# Patient Record
Sex: FEMALE | Race: OTHER | Employment: STUDENT | URBAN - METROPOLITAN AREA
[De-identification: names, ages, dates, MRNs, and addresses within clinical notes are randomized per-mention and may not be internally consistent; named-entity substitution may affect disease eponyms.]

---
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
## 2021-01-08 ENCOUNTER — ROUTINE PRENATAL (OUTPATIENT)
Dept: OBGYN CLINIC | Age: 21
End: 2021-01-08
Payer: COMMERCIAL

## 2021-01-08 VITALS
HEART RATE: 82 BPM | BODY MASS INDEX: 23.23 KG/M2 | WEIGHT: 127 LBS | DIASTOLIC BLOOD PRESSURE: 60 MMHG | SYSTOLIC BLOOD PRESSURE: 90 MMHG

## 2021-01-08 DIAGNOSIS — Z3A.21 21 WEEKS GESTATION OF PREGNANCY: ICD-10-CM

## 2021-01-08 DIAGNOSIS — Z34.82 ENCOUNTER FOR SUPERVISION OF OTHER NORMAL PREGNANCY IN SECOND TRIMESTER: Primary | ICD-10-CM

## 2021-01-08 PROCEDURE — G8427 DOCREV CUR MEDS BY ELIG CLIN: HCPCS | Performed by: OBSTETRICS & GYNECOLOGY

## 2021-01-08 PROCEDURE — 99212 OFFICE O/P EST SF 10 MIN: CPT | Performed by: OBSTETRICS & GYNECOLOGY

## 2021-01-08 PROCEDURE — G8484 FLU IMMUNIZE NO ADMIN: HCPCS | Performed by: OBSTETRICS & GYNECOLOGY

## 2021-01-08 PROCEDURE — 1036F TOBACCO NON-USER: CPT | Performed by: OBSTETRICS & GYNECOLOGY

## 2021-01-08 PROCEDURE — G8420 CALC BMI NORM PARAMETERS: HCPCS | Performed by: OBSTETRICS & GYNECOLOGY

## 2021-01-08 ASSESSMENT — PATIENT HEALTH QUESTIONNAIRE - PHQ9
SUM OF ALL RESPONSES TO PHQ QUESTIONS 1-9: 0
1. LITTLE INTEREST OR PLEASURE IN DOING THINGS: 0
SUM OF ALL RESPONSES TO PHQ9 QUESTIONS 1 & 2: 0
SUM OF ALL RESPONSES TO PHQ QUESTIONS 1-9: 0
2. FEELING DOWN, DEPRESSED OR HOPELESS: 0
SUM OF ALL RESPONSES TO PHQ QUESTIONS 1-9: 0

## 2021-01-08 NOTE — PROGRESS NOTES
OB visit    Leslie Ward is a 13y.o. year old female  @ 21.1 wk , L 2020 , TITI 2021 confirmed 6 wks US . POB: FTSVD @ 38 wks , female, Navjot Chimera .       PGYN: denies      PMH: arthritis , juvenile rheumatoid arthritis - was on treatment few years ago. MTX             Mild intermittant asthma , albuterol prn.      PSH: denies      MEDS: none      Drug Allergies: NKDA     SOCHX: denies x 3      FH: Mother or Father , DM No , HTN No, Other No    BP 90/60   Pulse 82   Wt 127 lb (57.6 kg)   LMP 2020   BMI 23.23 kg/m²   Past Medical History:   Diagnosis Date    Abnormal hemoglobin (Hgb) ([facility] 2017    Anemia     pt states she takes iron    Asthma     pt states born with asthma, pt uses albuterol    Rheumatoid arthritis flare ([facility] 2018    Spinal muscular atrophy ([facility] 2017     History reviewed. No pertinent surgical history. Review of Systems  Constitutional: negative  Genitourinary:see above  Integument/breast: negative  Behavioral/Psych: negative  Endocrine: negative     All other systems reviewed and are negative. Physical Exam:  BP 90/60   Pulse 82   Wt 127 lb (57.6 kg)   LMP 2020   BMI 23.23 kg/m²   Skin: Warm, dry, no lesions or rashes  Extremities: Without clubbing, cyanosis and edema. Palms and nails are normal. Ambulates without difficulty  Neurological: No gross sensory or motor deficits. Abdomen: Soft, non-tender without masses or organomegaly  bowel sounds normoactive    HOF : suprapubic     FHT : 152 bpm     Assessment:   1. Encounter for supervision of other normal pregnancy in second trimester    2. 21 weeks gestation of pregnancy        Plan:   No orders of the defined types were placed in this encounter. No orders of the defined types were placed in this encounter.     Plan:   Susan Holter, MD    Follow-up in 4 weeks  Early 1 hr OGTT - not indicated  Hep C screen indicated : no  FLU- indicated   TDAP- in third dimas Leonard M.D., SILVIA G

## 2021-02-03 ENCOUNTER — ROUTINE PRENATAL (OUTPATIENT)
Dept: OBGYN CLINIC | Age: 21
End: 2021-02-03
Payer: COMMERCIAL

## 2021-02-03 VITALS
SYSTOLIC BLOOD PRESSURE: 88 MMHG | DIASTOLIC BLOOD PRESSURE: 50 MMHG | BODY MASS INDEX: 23.59 KG/M2 | WEIGHT: 129 LBS | HEART RATE: 84 BPM

## 2021-02-03 DIAGNOSIS — Z34.82 ENCOUNTER FOR SUPERVISION OF OTHER NORMAL PREGNANCY IN SECOND TRIMESTER: Primary | ICD-10-CM

## 2021-02-03 DIAGNOSIS — O26.842 FUNDAL HEIGHT LOW FOR DATES IN SECOND TRIMESTER: ICD-10-CM

## 2021-02-03 DIAGNOSIS — Z3A.24 24 WEEKS GESTATION OF PREGNANCY: ICD-10-CM

## 2021-02-03 PROCEDURE — 99213 OFFICE O/P EST LOW 20 MIN: CPT | Performed by: OBSTETRICS & GYNECOLOGY

## 2021-02-03 PROCEDURE — G8427 DOCREV CUR MEDS BY ELIG CLIN: HCPCS | Performed by: OBSTETRICS & GYNECOLOGY

## 2021-02-03 PROCEDURE — G8484 FLU IMMUNIZE NO ADMIN: HCPCS | Performed by: OBSTETRICS & GYNECOLOGY

## 2021-02-03 PROCEDURE — G8420 CALC BMI NORM PARAMETERS: HCPCS | Performed by: OBSTETRICS & GYNECOLOGY

## 2021-02-03 PROCEDURE — 1036F TOBACCO NON-USER: CPT | Performed by: OBSTETRICS & GYNECOLOGY

## 2021-02-03 NOTE — PROGRESS NOTES
OB visit    Alayna Kumar is a 13y.o. year old female  @ 24.6 wk , L 2020 , TITI 2021 confirmed 6 wks US . POB: FTSVD @ 38 wks , female, Arvella Bones .       PGYN: denies      PMH: arthritis , juvenile rheumatoid arthritis - was on treatment few years ago. MTX             Mild intermittant asthma , albuterol prn.      PSH: denies      MEDS: none      Drug Allergies: NKDA     SOCHX: denies x 3      FH: Mother or Father , DM No , HTN No, Other No    BP (!) 88/50   Pulse 84   Wt 129 lb (58.5 kg)   LMP 2020   BMI 23.59 kg/m²   Past Medical History:   Diagnosis Date    Abnormal hemoglobin (Hgb) ([facility] 2017    Anemia     pt states she takes iron    Asthma     pt states born with asthma, pt uses albuterol    Rheumatoid arthritis flare ([address] 2018    Spinal muscular atrophy ([facility] 2017     History reviewed. No pertinent surgical history. Review of Systems  Constitutional: negative  Genitourinary:see above  Integument/breast: negative  Behavioral/Psych: negative  Endocrine: negative     All other systems reviewed and are negative. Physical Exam:  BP (!) 88/50   Pulse 84   Wt 129 lb (58.5 kg)   LMP 2020   BMI 23.59 kg/m²   Skin: Warm, dry, no lesions or rashes  Extremities: Without clubbing, cyanosis and edema. Palms and nails are normal. Ambulates without difficulty  Neurological: No gross sensory or motor deficits. Abdomen: Soft, non-tender without masses or organomegaly  bowel sounds normoactive    HOF : 25 cm      FHT : 152 bpm     Assessment:   1. Encounter for supervision of other normal pregnancy in second trimester    2. 24 weeks gestation of pregnancy    3.  Fundal height low for dates in second trimester        Plan:   Orders Placed This Encounter   Procedures    US OB 1 OR MORE FETUS LIMITED     Standing Status:   Future     Standing Expiration Date:   2/3/2022    CBC Auto Differential     Standing Status:   Future     Standing Expiration Date:   2/3/2022    Glucose tolerance, 1 hour     Standing Status:   Future     Standing Expiration Date:   2/3/2022        No orders of the defined types were placed in this encounter. Plan:   Claudetta Crews, MD    Follow-up in 4 weeks  Early 1 hr OGTT - not indicated  Hep C screen indicated : no  FLU- indicated   TDAP- in third trimester    Maycol Diaz M.D., F.A.C.O. G

## 2021-02-10 ENCOUNTER — HOSPITAL ENCOUNTER (OUTPATIENT)
Dept: ULTRASOUND IMAGING | Age: 21
Discharge: HOME | End: 2021-02-12
Payer: COMMERCIAL

## 2021-02-10 DIAGNOSIS — O26.842 FUNDAL HEIGHT LOW FOR DATES IN SECOND TRIMESTER: ICD-10-CM

## 2021-02-10 PROCEDURE — 76815 OB US LIMITED FETUS(S): CPT

## 2021-03-16 ENCOUNTER — ROUTINE PRENATAL (OUTPATIENT)
Dept: OBGYN CLINIC | Age: 21
End: 2021-03-16
Payer: COMMERCIAL

## 2021-03-16 VITALS
HEART RATE: 92 BPM | WEIGHT: 130 LBS | DIASTOLIC BLOOD PRESSURE: 62 MMHG | SYSTOLIC BLOOD PRESSURE: 102 MMHG | BODY MASS INDEX: 23.78 KG/M2

## 2021-03-16 DIAGNOSIS — Z3A.30 30 WEEKS GESTATION OF PREGNANCY: ICD-10-CM

## 2021-03-16 DIAGNOSIS — Z34.82 ENCOUNTER FOR SUPERVISION OF OTHER NORMAL PREGNANCY IN SECOND TRIMESTER: Primary | ICD-10-CM

## 2021-03-16 PROCEDURE — G8427 DOCREV CUR MEDS BY ELIG CLIN: HCPCS | Performed by: OBSTETRICS & GYNECOLOGY

## 2021-03-16 PROCEDURE — 99213 OFFICE O/P EST LOW 20 MIN: CPT | Performed by: OBSTETRICS & GYNECOLOGY

## 2021-03-16 PROCEDURE — G8420 CALC BMI NORM PARAMETERS: HCPCS | Performed by: OBSTETRICS & GYNECOLOGY

## 2021-03-16 PROCEDURE — 1036F TOBACCO NON-USER: CPT | Performed by: OBSTETRICS & GYNECOLOGY

## 2021-03-16 PROCEDURE — G8484 FLU IMMUNIZE NO ADMIN: HCPCS | Performed by: OBSTETRICS & GYNECOLOGY

## 2021-04-05 ENCOUNTER — ROUTINE PRENATAL (OUTPATIENT)
Dept: OBGYN CLINIC | Age: 21
End: 2021-04-05
Payer: COMMERCIAL

## 2021-04-05 VITALS
HEART RATE: 84 BPM | WEIGHT: 131 LBS | SYSTOLIC BLOOD PRESSURE: 92 MMHG | DIASTOLIC BLOOD PRESSURE: 50 MMHG | BODY MASS INDEX: 23.96 KG/M2

## 2021-04-05 DIAGNOSIS — Z34.82 ENCOUNTER FOR SUPERVISION OF OTHER NORMAL PREGNANCY IN SECOND TRIMESTER: Primary | ICD-10-CM

## 2021-04-05 DIAGNOSIS — Z3A.33 33 WEEKS GESTATION OF PREGNANCY: ICD-10-CM

## 2021-04-05 PROCEDURE — 99213 OFFICE O/P EST LOW 20 MIN: CPT | Performed by: OBSTETRICS & GYNECOLOGY

## 2021-04-05 PROCEDURE — G8427 DOCREV CUR MEDS BY ELIG CLIN: HCPCS | Performed by: OBSTETRICS & GYNECOLOGY

## 2021-04-05 PROCEDURE — G8420 CALC BMI NORM PARAMETERS: HCPCS | Performed by: OBSTETRICS & GYNECOLOGY

## 2021-04-05 PROCEDURE — 1036F TOBACCO NON-USER: CPT | Performed by: OBSTETRICS & GYNECOLOGY

## 2021-04-05 RX ORDER — ALBUTEROL SULFATE 90 UG/1
2 AEROSOL, METERED RESPIRATORY (INHALATION) EVERY 6 HOURS PRN
Qty: 1 INHALER | Refills: 0 | Status: SHIPPED | OUTPATIENT
Start: 2021-04-05

## 2021-04-05 RX ORDER — PNV NO.95/FERROUS FUM/FOLIC AC 28MG-0.8MG
1 TABLET ORAL 2 TIMES DAILY
Qty: 60 TABLET | Refills: 2 | Status: SHIPPED | OUTPATIENT
Start: 2021-04-05

## 2021-04-05 NOTE — PROGRESS NOTES
OB visit    Lilliana Kang is a 13y.o. year old female  @ 33.4 wk , L 2020 , TITI 2021 confirmed 6 wks US . POB: FTSVD @ 38 wks , female, Heydi Sepulveda .       PGYN: denies      PMH: arthritis , juvenile rheumatoid arthritis - was on treatment few years ago. MTX             Mild intermittant asthma , albuterol prn.      PSH: denies      MEDS: none      Drug Allergies: NKDA     SOCHX: denies x 3      FH: Mother or Father , DM No , HTN No, Other No    BP (!) 92/50   Pulse 84   Wt 131 lb (59.4 kg)   LMP 2020   BMI 23.96 kg/m²   Past Medical History:   Diagnosis Date    Abnormal hemoglobin (Hgb) ([facility] [address] 2017    Anemia     pt states she takes iron    Asthma     pt states born with asthma, pt uses albuterol    Rheumatoid arthritis flare ([facility] [address] 2018    Spinal muscular atrophy ([facility] [address] 2017     No past surgical history on file. Review of Systems  Constitutional: negative  Genitourinary:see above  Integument/breast: negative  Behavioral/Psych: negative  Endocrine: negative     All other systems reviewed and are negative. Physical Exam:  BP (!) 92/50   Pulse 84   Wt 131 lb (59.4 kg)   LMP 2020   BMI 23.96 kg/m²   Skin: Warm, dry, no lesions or rashes  Extremities: Without clubbing, cyanosis and edema. Palms and nails are normal. Ambulates without difficulty  Neurological: No gross sensory or motor deficits. Abdomen: Soft, non-tender without masses or organomegaly  bowel sounds normoactive    HOF : 33 cm      FHT : 152 bpm     Assessment:   1.  Encounter for supervision of other normal pregnancy in second trimester    2. 33 weeks gestation of pregnancy        Plan:   Orders Placed This Encounter   Procedures    US OB 1 OR MORE FETUS LIMITED     Standing Status:   Future     Standing Expiration Date:   2022        Orders Placed This Encounter   Medications    albuterol sulfate  (90 Base) MCG/ACT inhaler     Sig: Inhale 2 puffs into the lungs every 6 hours as needed for Wheezing Indications: as needed for asthma     Dispense:  1 Inhaler     Refill:  0    Ferrous Sulfate (IRON) 325 (65 Fe) MG TABS     Sig: Take 1 tablet by mouth 2 times daily     Dispense:  60 tablet     Refill:  2     Plan:   Kale Pappas MD    Follow-up in 2 weeks  Early 1 hr OGTT - not indicated  Hep C screen indicated : no  FLU- indicated   TDAP- in third trimester    Dion Pittman M.D., F.A.C.O. G

## 2021-04-13 ENCOUNTER — HOSPITAL ENCOUNTER (OUTPATIENT)
Dept: ULTRASOUND IMAGING | Age: 21
Discharge: HOME | End: 2021-04-15
Payer: COMMERCIAL

## 2021-04-13 DIAGNOSIS — Z3A.33 33 WEEKS GESTATION OF PREGNANCY: ICD-10-CM

## 2021-04-13 DIAGNOSIS — Z34.82 ENCOUNTER FOR SUPERVISION OF OTHER NORMAL PREGNANCY IN SECOND TRIMESTER: ICD-10-CM

## 2021-04-13 PROCEDURE — 76815 OB US LIMITED FETUS(S): CPT

## 2021-04-21 ENCOUNTER — ROUTINE PRENATAL (OUTPATIENT)
Dept: OBGYN CLINIC | Age: 21
End: 2021-04-21
Payer: COMMERCIAL

## 2021-04-21 VITALS
SYSTOLIC BLOOD PRESSURE: 100 MMHG | DIASTOLIC BLOOD PRESSURE: 68 MMHG | BODY MASS INDEX: 24.51 KG/M2 | WEIGHT: 134 LBS | HEART RATE: 75 BPM

## 2021-04-21 DIAGNOSIS — Z3A.35 35 WEEKS GESTATION OF PREGNANCY: ICD-10-CM

## 2021-04-21 DIAGNOSIS — Z34.82 ENCOUNTER FOR SUPERVISION OF OTHER NORMAL PREGNANCY IN SECOND TRIMESTER: ICD-10-CM

## 2021-04-21 DIAGNOSIS — Z34.82 ENCOUNTER FOR SUPERVISION OF OTHER NORMAL PREGNANCY IN SECOND TRIMESTER: Primary | ICD-10-CM

## 2021-04-21 PROCEDURE — 1036F TOBACCO NON-USER: CPT | Performed by: OBSTETRICS & GYNECOLOGY

## 2021-04-21 PROCEDURE — 99213 OFFICE O/P EST LOW 20 MIN: CPT | Performed by: OBSTETRICS & GYNECOLOGY

## 2021-04-21 PROCEDURE — G8420 CALC BMI NORM PARAMETERS: HCPCS | Performed by: OBSTETRICS & GYNECOLOGY

## 2021-04-21 PROCEDURE — G8428 CUR MEDS NOT DOCUMENT: HCPCS | Performed by: OBSTETRICS & GYNECOLOGY

## 2021-04-21 RX ORDER — METRONIDAZOLE 500 MG/1
500 TABLET ORAL 2 TIMES DAILY
Qty: 14 TABLET | Refills: 0 | Status: ON HOLD | OUTPATIENT
Start: 2021-04-21 | End: 2021-04-30 | Stop reason: HOSPADM

## 2021-04-21 NOTE — PROGRESS NOTES
OB visit    Ashley Molina is a 13y.o. year old female  @ 35.6 wk , L 2020 , TITI 2021 confirmed 6 wks US . POB: FTSVD @ 38 wks , female, Kasey Meter .       PGYN: denies      PMH: arthritis , juvenile rheumatoid arthritis - was on treatment few years ago. MTX             Mild intermittant asthma , albuterol prn.      PSH: denies      MEDS: none      Drug Allergies: NKDA     SOCHX: denies x 3      FH: Mother or Father , DM No , HTN No, Other No    /68   Pulse 75   Wt 134 lb (60.8 kg)   LMP 2020   BMI 24.51 kg/m²   Past Medical History:   Diagnosis Date    Abnormal hemoglobin (Hgb) ([address] 2017    Anemia     pt states she takes iron    Asthma     pt states born with asthma, pt uses albuterol    Rheumatoid arthritis flare ([address] 2018    Spinal muscular atrophy ([facility] 2017     No past surgical history on file. Review of Systems  Constitutional: negative  Genitourinary:see above  Integument/breast: negative  Behavioral/Psych: negative  Endocrine: negative     All other systems reviewed and are negative. Physical Exam:  /68   Pulse 75   Wt 134 lb (60.8 kg)   LMP 2020   BMI 24.51 kg/m²   Skin: Warm, dry, no lesions or rashes  Extremities: Without clubbing, cyanosis and edema. Palms and nails are normal. Ambulates without difficulty  Neurological: No gross sensory or motor deficits. Abdomen: Soft, non-tender without masses or organomegaly  bowel sounds normoactive    HOF : 35 cm      FHT : 152 bpm     Assessment:   1. Encounter for supervision of other normal pregnancy in second trimester    2. 35 weeks gestation of pregnancy        Plan:   No orders of the defined types were placed in this encounter.        Orders Placed This Encounter   Medications    metroNIDAZOLE (FLAGYL) 500 MG tablet     Sig: Take 1 tablet by mouth 2 times daily for 7 days     Dispense:  14 tablet     Refill:  0     Plan:   Varghese Sam MD    Follow-up in 1 weeks  Early 1 hr OGTT - not indicated  Hep C screen indicated : no  FLU- indicated   TDAP- in third trimester    Whit Alcantar M.D., F.A.C.O. G

## 2021-04-24 LAB — GROUP B STREP CULTURE: NORMAL

## 2021-04-28 ENCOUNTER — HOSPITAL ENCOUNTER (INPATIENT)
Age: 21
LOS: 2 days | Discharge: HOME | DRG: 560 | End: 2021-04-30
Attending: OBSTETRICS & GYNECOLOGY | Admitting: OBSTETRICS & GYNECOLOGY
Payer: COMMERCIAL

## 2021-04-28 PROBLEM — Z37.9 NORMAL LABOR: Status: ACTIVE | Noted: 2021-04-28

## 2021-04-28 LAB
ABO/RH: NORMAL
AMPHETAMINE SCREEN, URINE: ABNORMAL
ANTIBODY SCREEN: NORMAL
BACTERIA: ABNORMAL /HPF
BARBITURATE SCREEN URINE: ABNORMAL
BASOPHILS ABSOLUTE: 0 K/UL (ref 0–0.2)
BASOPHILS RELATIVE PERCENT: 0.3 %
BENZODIAZEPINE SCREEN, URINE: ABNORMAL
BILIRUBIN URINE: NEGATIVE
BLOOD, URINE: NEGATIVE
CANNABINOID SCREEN URINE: POSITIVE
CLARITY: ABNORMAL
COCAINE METABOLITE SCREEN URINE: ABNORMAL
COLOR: YELLOW
EOSINOPHILS ABSOLUTE: 0 K/UL (ref 0–0.7)
EOSINOPHILS RELATIVE PERCENT: 0.3 %
EPITHELIAL CELLS, UA: ABNORMAL /HPF (ref 0–5)
GLUCOSE URINE: NEGATIVE MG/DL
HCT VFR BLD CALC: 34.7 % (ref 37–47)
HEMOGLOBIN: 11.5 G/DL (ref 12–16)
HEPATITIS B SURFACE ANTIGEN INTERPRETATION: NORMAL
HYALINE CASTS: ABNORMAL /HPF (ref 0–5)
KETONES, URINE: ABNORMAL MG/DL
LEUKOCYTE ESTERASE, URINE: ABNORMAL
LYMPHOCYTES ABSOLUTE: 1.8 K/UL (ref 1–4.8)
LYMPHOCYTES RELATIVE PERCENT: 24.2 %
Lab: ABNORMAL
MCH RBC QN AUTO: 26.7 PG (ref 27–31.3)
MCHC RBC AUTO-ENTMCNC: 33.1 % (ref 33–37)
MCV RBC AUTO: 80.5 FL (ref 82–100)
METHADONE SCREEN, URINE: ABNORMAL
MONOCYTES ABSOLUTE: 0.5 K/UL (ref 0.2–0.8)
MONOCYTES RELATIVE PERCENT: 6.9 %
NEUTROPHILS ABSOLUTE: 5 K/UL (ref 1.4–6.5)
NEUTROPHILS RELATIVE PERCENT: 68.3 %
NITRITE, URINE: NEGATIVE
OPIATE SCREEN URINE: ABNORMAL
OXYCODONE URINE: ABNORMAL
PDW BLD-RTO: 14.3 % (ref 11.5–14.5)
PH UA: 6.5 (ref 5–9)
PHENCYCLIDINE SCREEN URINE: ABNORMAL
PLATELET # BLD: 151 K/UL (ref 130–400)
PROPOXYPHENE SCREEN: ABNORMAL
PROTEIN UA: ABNORMAL MG/DL
RBC # BLD: 4.32 M/UL (ref 4.2–5.4)
RBC UA: ABNORMAL /HPF (ref 0–5)
RPR: NORMAL
RUBELLA ANTIBODY IGG: 91.6 IU/ML
SARS-COV-2, NAAT: NOT DETECTED
SPECIFIC GRAVITY UA: 1.03 (ref 1–1.03)
UROBILINOGEN, URINE: 1 E.U./DL
WBC # BLD: 7.4 K/UL (ref 4.5–11)
WBC UA: ABNORMAL /HPF (ref 0–5)
YEAST: PRESENT /HPF

## 2021-04-28 PROCEDURE — 86762 RUBELLA ANTIBODY: CPT

## 2021-04-28 PROCEDURE — 99223 1ST HOSP IP/OBS HIGH 75: CPT | Performed by: OBSTETRICS & GYNECOLOGY

## 2021-04-28 PROCEDURE — 2580000003 HC RX 258: Performed by: OBSTETRICS & GYNECOLOGY

## 2021-04-28 PROCEDURE — 1220000000 HC SEMI PRIVATE OB R&B

## 2021-04-28 PROCEDURE — 59025 FETAL NON-STRESS TEST: CPT | Performed by: OBSTETRICS & GYNECOLOGY

## 2021-04-28 PROCEDURE — 81001 URINALYSIS AUTO W/SCOPE: CPT

## 2021-04-28 PROCEDURE — 86901 BLOOD TYPING SEROLOGIC RH(D): CPT

## 2021-04-28 PROCEDURE — 36415 COLL VENOUS BLD VENIPUNCTURE: CPT

## 2021-04-28 PROCEDURE — 87635 SARS-COV-2 COVID-19 AMP PRB: CPT

## 2021-04-28 PROCEDURE — 86900 BLOOD TYPING SEROLOGIC ABO: CPT

## 2021-04-28 PROCEDURE — 87086 URINE CULTURE/COLONY COUNT: CPT

## 2021-04-28 PROCEDURE — 80307 DRUG TEST PRSMV CHEM ANLYZR: CPT

## 2021-04-28 PROCEDURE — 87340 HEPATITIS B SURFACE AG IA: CPT

## 2021-04-28 PROCEDURE — 86850 RBC ANTIBODY SCREEN: CPT

## 2021-04-28 PROCEDURE — 6360000002 HC RX W HCPCS: Performed by: OBSTETRICS & GYNECOLOGY

## 2021-04-28 PROCEDURE — 86592 SYPHILIS TEST NON-TREP QUAL: CPT

## 2021-04-28 PROCEDURE — 6370000000 HC RX 637 (ALT 250 FOR IP): Performed by: OBSTETRICS & GYNECOLOGY

## 2021-04-28 PROCEDURE — 85025 COMPLETE CBC W/AUTO DIFF WBC: CPT

## 2021-04-28 PROCEDURE — 59409 OBSTETRICAL CARE: CPT | Performed by: OBSTETRICS & GYNECOLOGY

## 2021-04-28 PROCEDURE — 88307 TISSUE EXAM BY PATHOLOGIST: CPT

## 2021-04-28 RX ORDER — SODIUM CHLORIDE 9 MG/ML
25 INJECTION, SOLUTION INTRAVENOUS PRN
Status: DISCONTINUED | OUTPATIENT
Start: 2021-04-28 | End: 2021-04-30 | Stop reason: HOSPADM

## 2021-04-28 RX ORDER — HYDROCODONE BITARTRATE AND ACETAMINOPHEN 5; 325 MG/1; MG/1
1 TABLET ORAL EVERY 4 HOURS PRN
Status: DISCONTINUED | OUTPATIENT
Start: 2021-04-28 | End: 2021-04-30 | Stop reason: HOSPADM

## 2021-04-28 RX ORDER — ONDANSETRON 2 MG/ML
4 INJECTION INTRAMUSCULAR; INTRAVENOUS EVERY 6 HOURS PRN
Status: DISCONTINUED | OUTPATIENT
Start: 2021-04-28 | End: 2021-04-28

## 2021-04-28 RX ORDER — SODIUM CHLORIDE 0.9 % (FLUSH) 0.9 %
10 SYRINGE (ML) INJECTION EVERY 12 HOURS SCHEDULED
Status: DISCONTINUED | OUTPATIENT
Start: 2021-04-28 | End: 2021-04-28

## 2021-04-28 RX ORDER — ONDANSETRON 2 MG/ML
4 INJECTION INTRAMUSCULAR; INTRAVENOUS EVERY 6 HOURS PRN
Status: DISCONTINUED | OUTPATIENT
Start: 2021-04-28 | End: 2021-04-30 | Stop reason: HOSPADM

## 2021-04-28 RX ORDER — SODIUM CHLORIDE 0.9 % (FLUSH) 0.9 %
5-40 SYRINGE (ML) INJECTION EVERY 12 HOURS SCHEDULED
Status: DISCONTINUED | OUTPATIENT
Start: 2021-04-28 | End: 2021-04-30 | Stop reason: HOSPADM

## 2021-04-28 RX ORDER — SODIUM CHLORIDE, SODIUM LACTATE, POTASSIUM CHLORIDE, CALCIUM CHLORIDE 600; 310; 30; 20 MG/100ML; MG/100ML; MG/100ML; MG/100ML
INJECTION, SOLUTION INTRAVENOUS
Status: DISCONTINUED
Start: 2021-04-28 | End: 2021-04-28

## 2021-04-28 RX ORDER — SODIUM CHLORIDE 9 MG/ML
25 INJECTION, SOLUTION INTRAVENOUS PRN
Status: DISCONTINUED | OUTPATIENT
Start: 2021-04-28 | End: 2021-04-28

## 2021-04-28 RX ORDER — DOCUSATE SODIUM 100 MG/1
100 CAPSULE, LIQUID FILLED ORAL 2 TIMES DAILY PRN
Status: DISCONTINUED | OUTPATIENT
Start: 2021-04-28 | End: 2021-04-28

## 2021-04-28 RX ORDER — DIPHENHYDRAMINE HCL 25 MG
25 TABLET ORAL EVERY 4 HOURS PRN
Status: DISCONTINUED | OUTPATIENT
Start: 2021-04-28 | End: 2021-04-28

## 2021-04-28 RX ORDER — SODIUM CHLORIDE 0.9 % (FLUSH) 0.9 %
5-40 SYRINGE (ML) INJECTION PRN
Status: DISCONTINUED | OUTPATIENT
Start: 2021-04-28 | End: 2021-04-30 | Stop reason: HOSPADM

## 2021-04-28 RX ORDER — DOCUSATE SODIUM 100 MG/1
100 CAPSULE, LIQUID FILLED ORAL DAILY
Status: DISCONTINUED | OUTPATIENT
Start: 2021-04-28 | End: 2021-04-30 | Stop reason: HOSPADM

## 2021-04-28 RX ORDER — MODIFIED LANOLIN
OINTMENT (GRAM) TOPICAL PRN
Status: DISCONTINUED | OUTPATIENT
Start: 2021-04-28 | End: 2021-04-30 | Stop reason: HOSPADM

## 2021-04-28 RX ORDER — IBUPROFEN 800 MG/1
800 TABLET ORAL EVERY 8 HOURS PRN
Status: DISCONTINUED | OUTPATIENT
Start: 2021-04-28 | End: 2021-04-30 | Stop reason: HOSPADM

## 2021-04-28 RX ORDER — HYDROCODONE BITARTRATE AND ACETAMINOPHEN 5; 325 MG/1; MG/1
2 TABLET ORAL EVERY 4 HOURS PRN
Status: DISCONTINUED | OUTPATIENT
Start: 2021-04-28 | End: 2021-04-30 | Stop reason: HOSPADM

## 2021-04-28 RX ORDER — NALBUPHINE HCL 10 MG/ML
5 AMPUL (ML) INJECTION
Status: COMPLETED | OUTPATIENT
Start: 2021-04-28 | End: 2021-04-28

## 2021-04-28 RX ORDER — ACETAMINOPHEN 325 MG/1
650 TABLET ORAL EVERY 4 HOURS PRN
Status: DISCONTINUED | OUTPATIENT
Start: 2021-04-28 | End: 2021-04-28

## 2021-04-28 RX ORDER — NALBUPHINE HCL 10 MG/ML
5 AMPUL (ML) INJECTION
Status: DISCONTINUED | OUTPATIENT
Start: 2021-04-28 | End: 2021-04-28

## 2021-04-28 RX ORDER — ERYTHROMYCIN 5 MG/G
1 OINTMENT OPHTHALMIC ONCE
Status: DISCONTINUED | OUTPATIENT
Start: 2021-04-28 | End: 2021-04-28

## 2021-04-28 RX ORDER — PHYTONADIONE 1 MG/.5ML
1 INJECTION, EMULSION INTRAMUSCULAR; INTRAVENOUS; SUBCUTANEOUS ONCE
Status: DISCONTINUED | OUTPATIENT
Start: 2021-04-28 | End: 2021-04-28

## 2021-04-28 RX ORDER — SODIUM CHLORIDE, SODIUM LACTATE, POTASSIUM CHLORIDE, CALCIUM CHLORIDE 600; 310; 30; 20 MG/100ML; MG/100ML; MG/100ML; MG/100ML
INJECTION, SOLUTION INTRAVENOUS CONTINUOUS
Status: DISCONTINUED | OUTPATIENT
Start: 2021-04-28 | End: 2021-04-28

## 2021-04-28 RX ORDER — ACETAMINOPHEN 325 MG/1
650 TABLET ORAL EVERY 4 HOURS PRN
Status: DISCONTINUED | OUTPATIENT
Start: 2021-04-28 | End: 2021-04-30 | Stop reason: HOSPADM

## 2021-04-28 RX ORDER — SODIUM CHLORIDE 0.9 % (FLUSH) 0.9 %
10 SYRINGE (ML) INJECTION PRN
Status: DISCONTINUED | OUTPATIENT
Start: 2021-04-28 | End: 2021-04-28

## 2021-04-28 RX ADMIN — SODIUM CHLORIDE, POTASSIUM CHLORIDE, SODIUM LACTATE AND CALCIUM CHLORIDE: 600; 310; 30; 20 INJECTION, SOLUTION INTRAVENOUS at 09:21

## 2021-04-28 RX ADMIN — NALBUPHINE HYDROCHLORIDE 5 MG: 10 INJECTION, SOLUTION INTRAMUSCULAR; INTRAVENOUS; SUBCUTANEOUS at 08:27

## 2021-04-28 RX ADMIN — IBUPROFEN 800 MG: 800 TABLET ORAL at 19:21

## 2021-04-28 RX ADMIN — SODIUM CHLORIDE, POTASSIUM CHLORIDE, SODIUM LACTATE AND CALCIUM CHLORIDE: 600; 310; 30; 20 INJECTION, SOLUTION INTRAVENOUS at 08:28

## 2021-04-28 ASSESSMENT — PAIN SCALES - GENERAL
PAINLEVEL_OUTOF10: 8
PAINLEVEL_OUTOF10: 5
PAINLEVEL_OUTOF10: 7

## 2021-04-28 NOTE — H&P
Department of Obstetrics and Gynecology  Attending Obstetrics History and Physical        CHIEF COMPLAINT:  abdominal pain, contractions, Active Labor    HISTORY OF PRESENT ILLNESS:      The patient is 36.6 weeks gestation  show presents post coital with pelvic contractions and mucus. No bleeding no loss of fluid    Past Medical History:        Diagnosis Date    Abnormal hemoglobin (Hgb) ([facility]) 2017    Anemia     pt states she takes iron    Asthma     pt states born with asthma, pt uses albuterol    Rheumatoid arthritis flare ([facility] [address] 2018    Spinal muscular atrophy ([facility] [address] 2017     Past Surgical History:    No past surgical history on file. Social History:    TOBACCO:   reports that she has never smoked. She has never used smokeless tobacco.  ETOH:   reports no history of alcohol use. DRUGS:   reports no history of drug use. SEXUAL HISTORY:    DOMESTIC VIOLENCE:  no  ACTIVITIES OF DAILY LIVING:    INSTRUMENTAL ACTIVITIES OF DAILY LIVING:  Patient is able to perform all instrumental activities of daily living.   MARITAL STATUS:    OCCUPATION:    LEVEL OF EDUCATION:    Patient currently lives with family   Environmental/chemical exposure:  None  Travel History:  None   Pets:  None    Family History:       Problem Relation Age of Onset    Diabetes Maternal Aunt     Arthritis Maternal Grandmother     Diabetes Maternal Grandmother      Medications Prior to Admission:  Medications Prior to Admission: metroNIDAZOLE (FLAGYL) 500 MG tablet, Take 1 tablet by mouth 2 times daily for 7 days  albuterol sulfate  (90 Base) MCG/ACT inhaler, Inhale 2 puffs into the lungs every 6 hours as needed for Wheezing Indications: as needed for asthma  Ferrous Sulfate (IRON) 325 (65 Fe) MG TABS, Take 1 tablet by mouth 2 times daily  Ferrous Sulfate (IRON) 325 (65 Fe) MG TABS, Take 1 tablet by mouth 2 times daily  Prenatal Vit-Fe Fumarate-FA (PRENATAL VITAMIN) 27-0.8 MG TABS, Take 1 tablet by mouth daily  albuterol (PROVENTIL) (5 MG/ML) 0.5% nebulizer solution, Take 2.5 mg by nebulization every 6 hours as needed for Wheezing or Shortness of Breath (pt states has not used in last 3 months)  Allergies:  Patient has no known allergies. REVIEW OF SYSTEMS:    Patient has no history of depression. Patient has no symptoms of depression  CONSTITUTIONAL:  negative for  fevers, chills and sweats  RESPIRATORY:  negative for  dry cough and dyspnea  CARDIOVASCULAR:  negative for  chest pain, dyspnea, palpitations  GASTROINTESTINAL:  negative for nausea, vomiting, diarrhea and constipation  GENITOURINARY:  negative for frequency and dysuria  NEUROLOGICAL:  negative for headaches, dizziness and seizures  BEHAVIOR/PSYCH:  negative    PHYSICAL EXAM:    General appearance:  awake, alert, cooperative, no apparent distress, and appears stated age  Neurologic:  Awake, alert, oriented to name, place and time. Cranial nerves II-XII are grossly intact. Motor is 5 out of 5 bilaterally. Cerebellar finger to nose, heel to shin intact. Sensory is intact.   Babinski down going, Romberg negative, and gait is normal.  Lungs:  No increased work of breathing, good air exchange, clear to auscultation bilaterally, no crackles or wheezing  Heart:  Normal apical impulse, regular rate and rhythm, normal S1 and S2, no S3 or S4, and no murmur noted  Abdomen:  No scars, normal bowel sounds, soft, non-distended, non-tender, no masses palpated, no hepatosplenomegally  Fetal heart rate:  Baseline Heart Rate 110, accelerations:  present  Pelvis:  External Genitalia: General appearance; normal, Hair distribution; normal, Lesions absent  Anus/Perineum: Lesions absent and Masses absent  Cervix:    DILATION:  4 cm  EFFACEMENT:   50%  STATION:  -2 cm  CONSISTENCY:  medium  POSITION:  posterior      Contraction frequency:  8 minutes  Membranes:  Intact        General Labs:   Admission on 04/28/2021   Component Date Value Ref Range Status    Amphetamine Screen, Urine 04/28/2021 Neg  Negative <1000 ng/mL Final    Barbiturate Screen, Ur 04/28/2021 Neg  Negative < 200 ng/mL Final    Benzodiazepine Screen, Urine 04/28/2021 Neg  Negative < 200 ng/mL Final    Cannabinoid Scrn, Ur 04/28/2021 POSITIVE* Negative < 50 ng/mL Final    Cocaine Metabolite Screen, Urine 04/28/2021 Neg  Negative < 300 ng/mL Final    Opiate Scrn, Ur 04/28/2021 Neg  Negative < 300 ng/mL Final    PCP Screen, Urine 04/28/2021 Neg  Negative < 25 ng/mL Final    Methadone Screen, Urine 04/28/2021 Neg  Negative <300 ng/mL Final    Propoxyphene Scrn, Ur 04/28/2021 Neg  Negative <300 ng/mL Final    Oxycodone Urine 04/28/2021 Neg  Negative <100 ng/mL Final    Drug Screen Comment: 04/28/2021 see below   Final    Comment: This method is a screening test to detect only these drug  classes as part of a medical workup. Confirmatory testing  by another method should be ordered if clinically indicated.  Color, UA 04/28/2021 Yellow  Straw/Yellow Final    Clarity, UA 04/28/2021 CLOUDY* Clear Final    Glucose, Ur 04/28/2021 Negative  Negative mg/dL Final    Bilirubin Urine 04/28/2021 Negative  Negative Final    Ketones, Urine 04/28/2021 TRACE* Negative mg/dL Final    Specific Gravity, UA 04/28/2021 1.027  1.005 - 1.030 Final    Blood, Urine 04/28/2021 Negative  Negative Final    pH, UA 04/28/2021 6.5  5.0 - 9.0 Final    Protein, UA 04/28/2021 TRACE* Negative mg/dL Final    Urobilinogen, Urine 04/28/2021 1.0  <2.0 E.U./dL Final    Nitrite, Urine 04/28/2021 Negative  Negative Final    Leukocyte Esterase, Urine 04/28/2021 MODERATE* Negative Final    SARS-CoV-2, NAAT 04/28/2021 Not Detected  Not Detected Final    Comment: Rapid NAAT:   Negative results should be treated as presumptive and,  if inconsistent with clinical signs and symptoms or necessary for  patient management, should be tested with an alternative molecular  assay.  Negative results do not preclude SARS-CoV-2 infection and  should not be used as the sole basis for patient management decisions. This test has been authorized by the FDA under an Emergency Use  Authorization (EUA) for use by authorized laboratories. Fact sheet for Healthcare Providers:  Vinod[employer]truong  Fact sheet for Patients: Vinod[employer]truong    METHODOLOGY: Isothermal Nucleic Acid Amplification      ABO/Rh 04/28/2021 A POS   Final    Antibody Screen 04/28/2021 NEG   Final    WBC 04/28/2021 7.4  4.5 - 11.0 K/uL Final    RBC 04/28/2021 4.32  4.20 - 5.40 M/uL Final    Hemoglobin 04/28/2021 11.5* 12.0 - 16.0 g/dL Final    Hematocrit 04/28/2021 34.7* 37.0 - 47.0 % Final    MCV 04/28/2021 80.5* 82.0 - 100.0 fL Final    MCH 04/28/2021 26.7* 27.0 - 31.3 pg Final    MCHC 04/28/2021 33.1  33.0 - 37.0 % Final    RDW 04/28/2021 14.3  11.5 - 14.5 % Final    Platelets 15/06/8986 151  130 - 400 K/uL Final    Neutrophils % 04/28/2021 68.3  % Final    Lymphocytes % 04/28/2021 24.2  % Final    Monocytes % 04/28/2021 6.9  % Final    Eosinophils % 04/28/2021 0.3  % Final    Basophils % 04/28/2021 0.3  % Final    Neutrophils Absolute 04/28/2021 5.0  1.4 - 6.5 K/uL Final    Lymphocytes Absolute 04/28/2021 1.8  1.0 - 4.8 K/uL Final    Monocytes Absolute 04/28/2021 0.5  0.2 - 0.8 K/uL Final    Eosinophils Absolute 04/28/2021 0.0  0.0 - 0.7 K/uL Final    Basophils Absolute 04/28/2021 0.0  0.0 - 0.2 K/uL Final    Rubella Antibody IgG 04/28/2021 91.6  IU/mL Final    Comment: Patient's result indicates immunity.   Default Normal Ranges    >=10 Presumed Immune  <10  Presumed Not immune      Bacteria, UA 04/28/2021 MANY* Negative /HPF Final    Yeast, UA 04/28/2021 Present* None Seen /HPF Final    Hyaline Casts, UA 04/28/2021 10-20  0 - 5 /HPF Final    WBC, UA 04/28/2021 * 0 - 5 /HPF Final    RBC, UA 04/28/2021 6-10* 0 - 5 /HPF Final    Epithelial Cells, UA 04/28/2021 10-20  0 - 5 /HPF Final    Hep B S Ag Interp 2021 Non-reactive   Final       ASSESSMENT AND PLAN:    The patient is a 24 y.o.  @ 36w6d.      labor

## 2021-04-28 NOTE — OP NOTE
PROCEDURE NOTE: VAGINAL DELIVERY  24 y.o. Phyllis Walden at [address] who presented to [facility] triage for  labor. Pt underwent observation of labor which progressed once 6cm AROM noting clear fluid. Pt without epidural for pain management. Pt then with rectal pressure and the desire to push. PT was instructed on pushing efforts and the infant's head was delivered atraumatically followed quickly by the rest of the body. Nuchal cord x 1 was noted which doubly clamped and cut upon delivery. The infant with spontaneous cry was then laid on the mother's abdomen and the cord remained intact for 1 minute for delayed cord clamping. The cord was then clamped and cut and the infant was placed for skin to skin care. The cord blood was then drawn for labs and the placenta delivered spontaneously. Three vessel cord noted. The vaginal and cervix were inspected and no lacerations were noted. The infant, a viable male infant was born with Apgars 6 and 9 and wt pending  Mother was noted to have excellent uterine tone. Sponge and instrument counts were correct x 2 and mother and baby were recovered in room without difficulty.      EBL:   [address], DO

## 2021-04-28 NOTE — FLOWSHEET NOTE
Pt educated on the use of self-administered  nitrous oxide for pain control and side effects. Pt educated on when and how to use the mask appropriately. Pt educated that she is the only person allowed to hold the mask to her face and that no one should prop the mask against her face. Pt also educated that she is the only person in the room allowed to use the mask. Pt informed that she cannot be out of bed without a trained support person with her. Pt completed a return demonstration of the use of nitrous oxide and all questions and concerns were addressed. RN verified the presence of a signed agreement form for the nitrous oxide. Pre-intervention VS, assessment, and FHT'st as charted. Nitrous oxide and oxygen at a 50-50 mix was initiated at 1052 remains at bedside for the first 15 mins post initiation. Pt has experienced no as side effects at this time.

## 2021-04-28 NOTE — FLOWSHEET NOTE
Patient actively pushing. RN remains in continuous attendance at the bedside. Assessment & evaluation of fetal heart rate ongoing via continuous EFM' every 30 minutes. Self-administered nitrous oxide discontinued at 1103. Pt assessment, VS, and FHT's as charted.

## 2021-04-29 LAB
HCT VFR BLD CALC: 29.4 % (ref 37–47)
HEMOGLOBIN: 9.6 G/DL (ref 12–16)
MCH RBC QN AUTO: 26.6 PG (ref 27–31.3)
MCHC RBC AUTO-ENTMCNC: 32.8 % (ref 33–37)
MCV RBC AUTO: 81.1 FL (ref 82–100)
PDW BLD-RTO: 14.3 % (ref 11.5–14.5)
PLATELET # BLD: 103 K/UL (ref 130–400)
RBC # BLD: 3.63 M/UL (ref 4.2–5.4)
WBC # BLD: 7.8 K/UL (ref 4.5–11)

## 2021-04-29 PROCEDURE — 6370000000 HC RX 637 (ALT 250 FOR IP): Performed by: OBSTETRICS & GYNECOLOGY

## 2021-04-29 PROCEDURE — 36415 COLL VENOUS BLD VENIPUNCTURE: CPT

## 2021-04-29 PROCEDURE — S9443 LACTATION CLASS: HCPCS

## 2021-04-29 PROCEDURE — 99024 POSTOP FOLLOW-UP VISIT: CPT | Performed by: OBSTETRICS & GYNECOLOGY

## 2021-04-29 PROCEDURE — 1220000000 HC SEMI PRIVATE OB R&B

## 2021-04-29 PROCEDURE — 85027 COMPLETE CBC AUTOMATED: CPT

## 2021-04-29 RX ADMIN — IBUPROFEN 800 MG: 800 TABLET ORAL at 04:22

## 2021-04-29 RX ADMIN — HYDROCODONE BITARTRATE AND ACETAMINOPHEN 2 TABLET: 5; 325 TABLET ORAL at 00:48

## 2021-04-29 RX ADMIN — IBUPROFEN 800 MG: 800 TABLET ORAL at 23:25

## 2021-04-29 RX ADMIN — DOCUSATE SODIUM 100 MG: 100 CAPSULE, LIQUID FILLED ORAL at 09:23

## 2021-04-29 RX ADMIN — IBUPROFEN 800 MG: 800 TABLET ORAL at 15:09

## 2021-04-29 RX ADMIN — HYDROCODONE BITARTRATE AND ACETAMINOPHEN 1 TABLET: 5; 325 TABLET ORAL at 16:42

## 2021-04-29 RX ADMIN — BENZOCAINE AND LEVOMENTHOL: 200; 5 SPRAY TOPICAL at 17:36

## 2021-04-29 ASSESSMENT — PAIN SCALES - GENERAL
PAINLEVEL_OUTOF10: 8
PAINLEVEL_OUTOF10: 7
PAINLEVEL_OUTOF10: 1
PAINLEVEL_OUTOF10: 2
PAINLEVEL_OUTOF10: 8

## 2021-04-29 NOTE — PLAN OF CARE
Problem: Discharge Planning:  Goal: Discharged to appropriate level of care  Description: Discharged to appropriate level of care  Outcome: Ongoing     Problem: Fluid Volume - Imbalance:  Goal: Absence of imbalanced fluid volume signs and symptoms  Description: Absence of imbalanced fluid volume signs and symptoms  Outcome: Ongoing  Goal: Absence of postpartum hemorrhage signs and symptoms  Description: Absence of postpartum hemorrhage signs and symptoms  Outcome: Ongoing     Problem: Infection - Risk of, Puerperal Infection:  Goal: Will show no infection signs and symptoms  Description: Will show no infection signs and symptoms  Outcome: Ongoing     Problem: Mood - Altered:  Goal: Mood stable  Description: Mood stable  Outcome: Ongoing     Problem: Pain - Acute:  Goal: Pain level will decrease  Description: Pain level will decrease  Outcome: Ongoing

## 2021-04-29 NOTE — LACTATION NOTE
In to visit mother  Mother has a breast pump  Mother breast fed first child for 6 months    Encouraged mother to feed infant every 2-3 hours for 10-20 minutes per breast  Reviewed breastfeeding concepts with pt    Mother states she breast fed at [phone] for 15/10 minutes    Encouraged mother to call with the next breast feed    1422  In to visit mother   Mother states infant fed at 18 for 10 minutes   Mother did not call for assistance  Breast feeding not observed

## 2021-04-29 NOTE — PLAN OF CARE
Problem: Discharge Planning:  Goal: Discharged to appropriate level of care  Description: Discharged to appropriate level of care  4/29/2021 0955 by Rochelle Garay RN  Outcome: Ongoing  4/28/2021 2044 by Nicole Urbina RN  Outcome: Ongoing     Problem: Fluid Volume - Imbalance:  Goal: Absence of imbalanced fluid volume signs and symptoms  Description: Absence of imbalanced fluid volume signs and symptoms  4/29/2021 0955 by Rochelle Garay RN  Outcome: Ongoing  4/28/2021 2044 by Nicole Urbina RN  Outcome: Ongoing  Goal: Absence of postpartum hemorrhage signs and symptoms  Description: Absence of postpartum hemorrhage signs and symptoms  4/29/2021 0955 by Rochelle Garay RN  Outcome: Ongoing  4/28/2021 2044 by Nicole Urbina RN  Outcome: Ongoing     Problem: Infection - Risk of, Puerperal Infection:  Goal: Will show no infection signs and symptoms  Description: Will show no infection signs and symptoms  4/29/2021 0955 by Rochelle Garay RN  Outcome: Ongoing  4/28/2021 2044 by Nicole Urbina RN  Outcome: Ongoing     Problem: Mood - Altered:  Goal: Mood stable  Description: Mood stable  4/29/2021 0955 by Rochelle Garay RN  Outcome: Ongoing  4/28/2021 2044 by Nicole Urbina RN  Outcome: Ongoing     Problem: Pain - Acute:  Goal: Pain level will decrease  Description: Pain level will decrease  4/29/2021 0955 by Rochelle Garay RN  Outcome: Ongoing  4/28/2021 2044 by Nicole Urbina RN  Outcome: Ongoing

## 2021-04-29 NOTE — PROGRESS NOTES
PROGRESS NOTE: POSTPARTUM DAY 1    Pt is doing well. Pt is ambulation and tolerating po. Pt breast feeding without issue. Lochia wnl    BP (!) 107/52   Pulse 67   Temp 97.5 °F (36.4 °C) (Oral)   Resp 16   LMP 2020 (Approximate)   Breastfeeding Unknown   Hemoglobin   Date Value Ref Range Status   2021 9.6 (L) 12.0 - 16.0 g/dL Final     CVS: RRR  Lungs: CTAB  ABD: Uterus firm at umbilicus  EXT: no c/c/e    24 y.o.  now P2 s/p vaginal delivery doing well PPD#1  1. May discharge to home when infant released  2. Rx Ibuprofen to pharmacy   3.  F/u with Dr. Maria Teresa Lopez in [place]

## 2021-04-30 VITALS
TEMPERATURE: 97.7 F | RESPIRATION RATE: 18 BRPM | DIASTOLIC BLOOD PRESSURE: 58 MMHG | HEART RATE: 66 BPM | SYSTOLIC BLOOD PRESSURE: 106 MMHG

## 2021-04-30 PROBLEM — Z37.9 NORMAL LABOR: Status: RESOLVED | Noted: 2021-04-28 | Resolved: 2021-04-30

## 2021-04-30 LAB — URINE CULTURE, ROUTINE: NORMAL

## 2021-04-30 PROCEDURE — 90471 IMMUNIZATION ADMIN: CPT | Performed by: OBSTETRICS & GYNECOLOGY

## 2021-04-30 PROCEDURE — 94664 DEMO&/EVAL PT USE INHALER: CPT

## 2021-04-30 PROCEDURE — 7200000001 HC VAGINAL DELIVERY

## 2021-04-30 PROCEDURE — 6370000000 HC RX 637 (ALT 250 FOR IP): Performed by: OBSTETRICS & GYNECOLOGY

## 2021-04-30 PROCEDURE — 6360000002 HC RX W HCPCS: Performed by: OBSTETRICS & GYNECOLOGY

## 2021-04-30 PROCEDURE — 90715 TDAP VACCINE 7 YRS/> IM: CPT | Performed by: OBSTETRICS & GYNECOLOGY

## 2021-04-30 RX ORDER — PSEUDOEPHEDRINE HCL 30 MG
100 TABLET ORAL DAILY
Qty: 120 CAPSULE | Refills: 0 | Status: SHIPPED | OUTPATIENT
Start: 2021-05-01

## 2021-04-30 RX ORDER — ALBUTEROL SULFATE 90 UG/1
2 AEROSOL, METERED RESPIRATORY (INHALATION) EVERY 6 HOURS PRN
Status: DISCONTINUED | OUTPATIENT
Start: 2021-04-30 | End: 2021-04-30 | Stop reason: HOSPADM

## 2021-04-30 RX ORDER — IBUPROFEN 800 MG/1
800 TABLET ORAL EVERY 8 HOURS PRN
Qty: 120 TABLET | Refills: 0 | Status: SHIPPED | OUTPATIENT
Start: 2021-04-30

## 2021-04-30 RX ADMIN — HYDROCODONE BITARTRATE AND ACETAMINOPHEN 2 TABLET: 5; 325 TABLET ORAL at 16:46

## 2021-04-30 RX ADMIN — TETANUS TOXOID, REDUCED DIPHTHERIA TOXOID AND ACELLULAR PERTUSSIS VACCINE, ADSORBED 0.5 ML: 5; 2.5; 8; 8; 2.5 SUSPENSION INTRAMUSCULAR at 18:22

## 2021-04-30 RX ADMIN — DOCUSATE SODIUM 100 MG: 100 CAPSULE, LIQUID FILLED ORAL at 09:48

## 2021-04-30 RX ADMIN — IBUPROFEN 800 MG: 800 TABLET ORAL at 12:58

## 2021-04-30 ASSESSMENT — PAIN SCALES - GENERAL: PAINLEVEL_OUTOF10: 4

## 2021-04-30 NOTE — LACTATION NOTE
In to visit mother  Mother on phone with Dr office     12  Infant in nursery for circumcision  Mother resting    [phone]  Infant sleeping encouraged mother to hold him skin to skin and attempt to breast feed with in an hour    Mother informed she needs to come back on Saturday 5/1/21  At 1000    Encouraged to call with the next breast feeding session    1325  Infant to breast   Infant latched  Rhythmic sucking  Infant pulling lower lip inwards  Lip readjusted  Encouraged mother to breast feed for 10-20 minutes per breast every 2-3 hours  Mother informed about [employer]

## 2021-04-30 NOTE — FLOWSHEET NOTE
Discharge instructions and teaching explained to patient. Verbalized understanding. Patient discharged via wheelchair accompanied by nurse and significant other. Baby in carseat. All personal belongings with patient.

## 2021-04-30 NOTE — FLOWSHEET NOTE
Patient states she will be needing an inhaler, feeling short of breath. Lungs are clear anterior and posterior. Posterior patient has a slight wheeze on expiration. Dr. Taty Leiva is called and notified. Will be ordering an inhaler.

## 2021-04-30 NOTE — PLAN OF CARE
Problem: Discharge Planning:  Goal: Discharged to appropriate level of care  Description: Discharged to appropriate level of care  4/30/2021 1220 by Joan Griggs RN  Outcome: Met This Shift  4/29/2021 2238 by Melissa Mazariegos RN  Outcome: Ongoing     Problem: Fluid Volume - Imbalance:  Goal: Absence of imbalanced fluid volume signs and symptoms  Description: Absence of imbalanced fluid volume signs and symptoms  4/30/2021 1220 by Joan Griggs RN  Outcome: Met This Shift  4/29/2021 2238 by Melissa Mazariegos RN  Outcome: Ongoing  Goal: Absence of postpartum hemorrhage signs and symptoms  Description: Absence of postpartum hemorrhage signs and symptoms  4/30/2021 1220 by Joan Griggs RN  Outcome: Met This Shift  4/29/2021 2238 by Melissa Mazariegos RN  Outcome: Ongoing     Problem: Infection - Risk of, Puerperal Infection:  Goal: Will show no infection signs and symptoms  Description: Will show no infection signs and symptoms  4/30/2021 1220 by Joan Griggs RN  Outcome: Met This Shift  4/29/2021 2238 by Melissa Mazariegos RN  Outcome: Ongoing     Problem: Mood - Altered:  Goal: Mood stable  Description: Mood stable  4/30/2021 1220 by Joan Griggs RN  Outcome: Met This Shift  4/29/2021 2238 by Melissa Mazariegos RN  Outcome: Ongoing     Problem: Pain - Acute:  Goal: Pain level will decrease  Description: Pain level will decrease  4/30/2021 1220 by Joan Griggs RN  Outcome: Met This Shift  4/29/2021 2238 by Melissa Mazariegos RN  Outcome: Ongoing

## 2021-04-30 NOTE — PROGRESS NOTES
[facility] Respiratory Therapy Evaluation   Current Order: alb Q6 prn      Home Regimen: alb Q6 prn      Ordering Physician: Roberto Kang  Re-evaluation Date:  5/3     Diagnosis: labor     Patient Status: Stable / Unstable + Physician notified    The following MDI Criteria must be met in order to convert aerosol to MDI with spacer. If unable to meet, MDI will be converted to aerosol:  []  Patient able to demonstrate the ability to use MDI effectively  []  Patient alert and cooperative  []  Patient able to take deep breath with 5-10 second hold  []  Medication(s) available in this delivery method   []  Peak flow greater than or equal to 200 ml/min            Current Order Substituted To  (same drug, same frequency)   Aerosol to MDI [] Albuterol Sulfate 0.083% unit dose by aerosol Albuterol Sulfate MDI 2 puffs by inhalation with spacer    [] Levalbuterol 1.25 mg unit dose by aerosol Levalbuterol MDI 2 puffs by inhalation with spacer    [] Levalbuterol 0.63 mg unit dose by aerosol Levalbuterol MDI 2 puffs by inhalation with spacer    [] Ipratropium Bromide 0.02% unit dose by aerosol Ipratropium Bromide MDI 2 puffs by inhalation with spacer    [] Duoneb (Ipratropium + Albuterol) unit dose by aerosol Ipratropium MDI + Albuterol MDI 2 puffs by inhalation w/spacer   MDI to Aerosol [] Albuterol Sulfate MDI Albuterol Sulfate 0.083% unit dose by aerosol    [] Levalbuterol MDI 2 puffs by inhalation Levalbuterol 1.25 mg unit dose by aerosol    [] Ipratropium Bromide MDI by inhalation Ipratropium Bromide 0.02% unit dose by aerosol    [] Combivent (Ipratropium + Albuterol) MDI by inhalation Duoneb (Ipratropium + Albuterol) unit dose by aerosol       Treatment Assessment [Frequency/Schedule]:  Change frequency to: __________________no change________________________________per Protocol, P&T, MEC      Points 0 1 2 3 4   Pulmonary Status  Non-Smoker  [x]   Smoking history   < 20 pack years  []   Smoking history  ?  20 pack years  [] Pulmonary Disorder  (acute or chronic)  []   Severe or Chronic w/ Exacerbation  []     Surgical Status No [x]   Surgeries     General []   Surgery Lower []   Abdominal Thoracic or []   Upper Abdominal Thoracic with  PulmonaryDisorder  []     Chest X-ray Clear/Not  Ordered     [x]  Chronic Changes  Results Pending  []  Infiltrates, atelectasis, pleural effusion, or edema  []  Infiltrates in more than one lobe []  Infiltrate + Atelectasis, &/or pleural effusion  []    Respiratory Pattern Regular,  RR = 12-20 [x]  Increased,  RR = 21-25 []  PEARSON, irregular,  or RR = 26-30 []  Decreased FEV1  or RR = 31-35 []  Severe SOB, use  of accessory muscles, or RR ? 35  []    Mental Status Alert, oriented,  Cooperative [x]  Confused but Follows commands []  Lethargic or unable to follow commands []  Obtunded  []  Comatose  []    Breath Sounds Clear to  auscultation  [x]  Decreased unilaterally or  in bases only []  Decreased  bilaterally  []  Crackles or intermittent wheezes []  Wheezes []    Cough Strong, Spontan., & nonproductive [x]  Strong,  spontaneous, &  productive []  Weak,  Nonproductive []  Weak, productive or  with wheezes []  No spontaneous  cough or may require suctioning []    Level of Activity Ambulatory [x]  Ambulatory w/ Assist  []  Non-ambulatory []  Paraplegic []  Quadriplegic []    Total    Score:____0___     Triage Score:____5____      Tri       Triage:     1. (>20) Freq: Q3    2. (16-20) Freq: Q4   3. (11-15) Freq: QID & Albuterol Q2 PRN    4. (6-10) Freq: TID & Albuterol Q2 PRN    5. (0-5) Freq Q4prn

## 2021-04-30 NOTE — DISCHARGE SUMMARY
Department of Obstetrics and Gynecology  Labor and Delivery  Attending   Post Partum Progress Note and Discharge Summary  21      SUBJECTIVE:  Pt doing well. Ambulating, voiding without difficulty, tolerating regular diet. VB is minimal. No hx of low plt or bleeding diathesis. No anemia related sx. Pt reported some mild SOB c/w her usual asthma. No cough, chest pain, hemoptysis, leg pain, fever. Denies F/C, N/V, CP, palpitations, HA, GI or  complaints, or leg pain    OBJECTIVE:      Vitals:  Vitals:    21 0839 21 1159 21 2135 21 0413   BP: (!) 107/52 (!) 104/50 (!) 118/53 90/68   Pulse: 67 67 85 66   Resp: 16 16 16 16   Temp: 97.5 °F (36.4 °C) 97.3 °F (36.3 °C) 98.9 °F (37.2 °C) 98.1 °F (36.7 °C)   TempSrc: Oral Oral Oral Oral       A+O x3. No distress  Even, unlabored respirations  CTAB  RRR  Soft, NT, ND  Fundus is firm at U - 1    MSK 5/5 LE  Neuro nonfocal  Mood: good, affect full range      DATA:    CBC:    Lab Results   Component Value Date    WBC 7.8 2021    RBC 3.63 2021    RBC 4.35 2011    HGB 9.6 2021    HCT 29.4 2021    MCV 81.1 2021    RDW 14.3 2021     2021             ASSESSMENT & PLAN:         24 y.o.. PPD 2 from  doing well     - PP HgB with mild to moderate asymptomatic iron def anemia. Start iron supplement    - Thrombocytopenia: no clinical concerns. The patient was educated on red flag symptoms that should prompt return to hospital.   - Asthma: Albuterol PRN. Takes albuterol <1x / week. No concerns for other cardiopulmonary process.    - Plan for DC today  - Return precautions and f/u instructions given     Admitting Diagnosis  IUP 36+6 weeks  OB History        2    Para   2    Term   1       1    AB        Living   2       SAB        TAB        Ectopic        Molar        Multiple   0    Live Births   2                Reasons for Admission on 2021  5:13 AM  Vaginal delivery [O80]  Normal labor [O80, Z37.9]  No comment available  Onset of Labor    Prenatal Procedures  None    Intrapartum Procedures        Multiple birth?: No        Spontaneous Vaginal Delivery: See Labor and Delivery Summary       Postpartum Procedures  None    Postpartum/Operative Complications        Data  Information for the patient's :  Cheikh Rueda [[id number]]   male   Birth Weight: 5 lb 11 oz (2.58 kg)     Discharge With Mother  Complications: No    Discharge Diagnosis       Discharge Information  Current Discharge Medication List      START taking these medications    Details   docusate sodium (COLACE, DULCOLAX) 100 MG CAPS Take 100 mg by mouth daily  Qty: 120 capsule, Refills: 0      ibuprofen (ADVIL;MOTRIN) 800 MG tablet Take 1 tablet by mouth every 8 hours as needed for Pain  Qty: 120 tablet, Refills: 0         CONTINUE these medications which have NOT CHANGED    Details   albuterol sulfate  (90 Base) MCG/ACT inhaler Inhale 2 puffs into the lungs every 6 hours as needed for Wheezing Indications: as needed for asthma  Qty: 1 Inhaler, Refills: 0      Ferrous Sulfate (IRON) 325 (65 Fe) MG TABS Take 1 tablet by mouth 2 times daily  Qty: 60 tablet, Refills: 2      Prenatal Vit-Fe Fumarate-FA (PRENATAL VITAMIN) 27-0.8 MG TABS Take 1 tablet by mouth daily  Qty: 60 tablet, Refills: 2         STOP taking these medications       metroNIDAZOLE (FLAGYL) 500 MG tablet Comments:   Reason for Stopping:         albuterol (PROVENTIL) (5 MG/ML) 0.5% nebulizer solution Comments:   Reason for Stopping:               No discharge procedures on file. Discharge to: Home  Follow up in 6-8 weeks at primary OB's office .        Discharge Date: 21         Electronically signed by Tong Yin MD on 2021 at 10:28 AM

## 2021-04-30 NOTE — CARE COORDINATION
LSW met with pt and FOB to discuss DC needs. Pt was positive for THC in her urine as was baby. Pt lives with FOB and her 1year old daughter. Pt says that she has all needs for baby at home and has good family support for help with the kids. She has a resource Mother ( Diana Romero). Pt says that she has all things needed for baby at home. Resources were reviewed for help as needed. We discussed THC use and pt admitted to use to help with nausea during her pregnancy. [facility] referral was called to Cara in intake and they will follow up with mom and baby at home. Pt and baby can DC home today.

## 2021-05-03 ENCOUNTER — OFFICE VISIT (OUTPATIENT)
Dept: OBGYN CLINIC | Age: 21
End: 2021-05-03
Payer: COMMERCIAL

## 2021-05-03 VITALS
DIASTOLIC BLOOD PRESSURE: 68 MMHG | WEIGHT: 118 LBS | HEIGHT: 62 IN | BODY MASS INDEX: 21.71 KG/M2 | SYSTOLIC BLOOD PRESSURE: 112 MMHG | HEART RATE: 80 BPM

## 2021-05-03 DIAGNOSIS — R10.2 VAGINAL PAIN: Primary | ICD-10-CM

## 2021-05-03 PROCEDURE — 99212 OFFICE O/P EST SF 10 MIN: CPT | Performed by: OBSTETRICS & GYNECOLOGY

## 2021-05-03 RX ORDER — AMOXICILLIN AND CLAVULANATE POTASSIUM 875; 125 MG/1; MG/1
1 TABLET, FILM COATED ORAL 2 TIMES DAILY
Qty: 20 TABLET | Refills: 0 | Status: SHIPPED | OUTPATIENT
Start: 2021-05-03 | End: 2021-05-13

## 2021-05-03 RX ORDER — ACETAMINOPHEN 500 MG
1000 TABLET ORAL EVERY 6 HOURS PRN
Qty: 60 TABLET | Refills: 0 | Status: SHIPPED | OUTPATIENT
Start: 2021-05-03

## 2021-05-03 RX ORDER — OXYCODONE HYDROCHLORIDE AND ACETAMINOPHEN 5; 325 MG/1; MG/1
1 TABLET ORAL EVERY 6 HOURS PRN
Qty: 6 TABLET | Refills: 0 | Status: SHIPPED | OUTPATIENT
Start: 2021-05-03 | End: 2021-05-06

## 2021-05-03 NOTE — PROGRESS NOTES
Dispense:  20 tablet     Refill:  0    oxyCODONE-acetaminophen (PERCOCET) 5-325 MG per tablet     Sig: Take 1 tablet by mouth every 6 hours as needed for Pain for up to 3 days. Dispense:  6 tablet     Refill:  0     Reduce doses taken as pain becomes manageable    acetaminophen (TYLENOL) 500 MG tablet     Sig: Take 2 tablets by mouth every 6 hours as needed for Pain     Dispense:  60 tablet     Refill:  0       Follow Up:  Return in about 4 weeks (around 5/31/2021) for Postpartum check.         Cale Dawn MD

## 2021-06-08 ENCOUNTER — OFFICE VISIT (OUTPATIENT)
Dept: OBGYN CLINIC | Age: 21
End: 2021-06-08
Payer: COMMERCIAL

## 2021-06-08 VITALS
WEIGHT: 111 LBS | BODY MASS INDEX: 20.43 KG/M2 | DIASTOLIC BLOOD PRESSURE: 62 MMHG | SYSTOLIC BLOOD PRESSURE: 108 MMHG | HEART RATE: 84 BPM | HEIGHT: 62 IN

## 2021-06-08 DIAGNOSIS — Z30.430 ENCOUNTER FOR INSERTION OF MIRENA IUD: ICD-10-CM

## 2021-06-08 DIAGNOSIS — N92.6 IRREGULAR MENSES: Primary | ICD-10-CM

## 2021-06-08 DIAGNOSIS — N63.20 LEFT BREAST LUMP: ICD-10-CM

## 2021-06-08 LAB
HCG, URINE, POC: NEGATIVE
Lab: NORMAL
NEGATIVE QC PASS/FAIL: NORMAL
POSITIVE QC PASS/FAIL: NORMAL

## 2021-06-08 PROCEDURE — 81025 URINE PREGNANCY TEST: CPT | Performed by: OBSTETRICS & GYNECOLOGY

## 2021-06-08 PROCEDURE — 58300 INSERT INTRAUTERINE DEVICE: CPT | Performed by: OBSTETRICS & GYNECOLOGY

## 2021-06-08 NOTE — PROGRESS NOTES
IUD Insertion Procedure Note    Pre-operative Diagnosis: irregular vaginal bleeding    Post-operative Diagnosis: same    Indications: irregular vaginal bleeding    Procedure Details   Urine pregnancy test was done and result was negative. The risks (including infection, bleeding, pain, and uterine perforation) and benefits of the procedure were explained to the patient and Written informed consent was obtained. Cervix cleansed with Betadine. Uterus sounded to 7 cm. IUD inserted without difficulty. String visible and trimmed. Patient tolerated procedure well. IUD Information:  Mirena. Condition:  Stable    Complications:  None    Plan:    The patient was advised to call for any fever or for prolonged or severe pain or bleeding. She was advised to use OTC acetaminophen, OTC ibuprofen as needed for mild to moderate pain. Follow up:  Return in about 4 weeks (around 7/6/2021) for IUD check.       Missy Gonzalez MD

## 2021-09-30 ENCOUNTER — HOSPITAL ENCOUNTER (EMERGENCY)
Age: 21
Discharge: HOME | End: 2021-09-30
Attending: EMERGENCY MEDICINE
Payer: COMMERCIAL

## 2021-09-30 VITALS
TEMPERATURE: 98.1 F | SYSTOLIC BLOOD PRESSURE: 116 MMHG | DIASTOLIC BLOOD PRESSURE: 75 MMHG | OXYGEN SATURATION: 100 % | WEIGHT: 103 LBS | HEART RATE: 81 BPM | RESPIRATION RATE: 16 BRPM | HEIGHT: 62 IN | BODY MASS INDEX: 18.95 KG/M2

## 2021-09-30 DIAGNOSIS — N30.01 ACUTE CYSTITIS WITH HEMATURIA: Primary | ICD-10-CM

## 2021-09-30 LAB
AMORPHOUS: ABNORMAL
BACTERIA: ABNORMAL /HPF
BILIRUBIN URINE: NEGATIVE
BLOOD, URINE: ABNORMAL
CLARITY: ABNORMAL
COLOR: YELLOW
EPITHELIAL CELLS, UA: ABNORMAL /HPF
GLUCOSE URINE: NEGATIVE MG/DL
HCG(URINE) PREGNANCY TEST: NEGATIVE
KETONES, URINE: ABNORMAL MG/DL
LEUKOCYTE ESTERASE, URINE: ABNORMAL
NITRITE, URINE: NEGATIVE
PH UA: 6 (ref 5–9)
PROTEIN UA: >=300 MG/DL
RBC UA: ABNORMAL /HPF (ref 0–2)
SPECIFIC GRAVITY UA: >=1.03 (ref 1–1.03)
URINE REFLEX TO CULTURE: YES
UROBILINOGEN, URINE: 0.2 E.U./DL
WBC UA: ABNORMAL /HPF (ref 0–5)

## 2021-09-30 PROCEDURE — 99283 EMERGENCY DEPT VISIT LOW MDM: CPT

## 2021-09-30 PROCEDURE — 87086 URINE CULTURE/COLONY COUNT: CPT

## 2021-09-30 PROCEDURE — 84703 CHORIONIC GONADOTROPIN ASSAY: CPT

## 2021-09-30 PROCEDURE — 81001 URINALYSIS AUTO W/SCOPE: CPT

## 2021-09-30 RX ORDER — PHENAZOPYRIDINE HYDROCHLORIDE 200 MG/1
200 TABLET, FILM COATED ORAL 3 TIMES DAILY PRN
Qty: 6 TABLET | Refills: 0 | Status: SHIPPED | OUTPATIENT
Start: 2021-09-30 | End: 2021-10-03

## 2021-09-30 RX ORDER — SULFAMETHOXAZOLE AND TRIMETHOPRIM 800; 160 MG/1; MG/1
1 TABLET ORAL 2 TIMES DAILY
Qty: 20 TABLET | Refills: 0 | Status: SHIPPED | OUTPATIENT
Start: 2021-09-30 | End: 2021-09-30

## 2021-09-30 RX ORDER — SULFAMETHOXAZOLE AND TRIMETHOPRIM 800; 160 MG/1; MG/1
1 TABLET ORAL 2 TIMES DAILY
Qty: 20 TABLET | Refills: 0 | Status: SHIPPED | OUTPATIENT
Start: 2021-09-30 | End: 2021-10-10

## 2021-09-30 ASSESSMENT — PAIN SCALES - GENERAL: PAINLEVEL_OUTOF10: 8

## 2021-09-30 ASSESSMENT — ENCOUNTER SYMPTOMS
VOMITING: 0
SHORTNESS OF BREATH: 0
COUGH: 0
CHOKING: 0
FACIAL SWELLING: 0
VOICE CHANGE: 0
EYE DISCHARGE: 0
ABDOMINAL PAIN: 0
CONSTIPATION: 0
EYE REDNESS: 0
DIARRHEA: 0
TROUBLE SWALLOWING: 0
WHEEZING: 0
EYE PAIN: 0
SORE THROAT: 0
BACK PAIN: 0
STRIDOR: 0
BLOOD IN STOOL: 0
CHEST TIGHTNESS: 0
SINUS PRESSURE: 0

## 2021-09-30 ASSESSMENT — PAIN DESCRIPTION - LOCATION: LOCATION: ABDOMEN;VAGINA;PELVIS

## 2021-09-30 ASSESSMENT — PAIN DESCRIPTION - ORIENTATION: ORIENTATION: RIGHT;LEFT;LOWER

## 2021-09-30 ASSESSMENT — PAIN DESCRIPTION - PAIN TYPE: TYPE: ACUTE PAIN

## 2021-09-30 ASSESSMENT — PAIN DESCRIPTION - DESCRIPTORS: DESCRIPTORS: BURNING;ACHING;SHARP

## 2021-09-30 ASSESSMENT — PAIN DESCRIPTION - PROGRESSION: CLINICAL_PROGRESSION: GRADUALLY WORSENING

## 2021-09-30 ASSESSMENT — PAIN DESCRIPTION - FREQUENCY: FREQUENCY: INTERMITTENT

## 2021-09-30 ASSESSMENT — PAIN DESCRIPTION - ONSET: ONSET: SUDDEN

## 2021-09-30 NOTE — ED PROVIDER NOTES
[facility] ED  eMERGENCY dEPARTMENT eNCOUnter      Pt Name: Sotero Dee  MRN: [id number]  [place] 2000  Date of evaluation: 9/30/2021  Provider: Edwin Vazquez MD    [address]       Chief Complaint   Patient presents with    Abdominal Pain     and blood in urine x 45 days          HISTORY OF PRESENT ILLNESS   (Location/Symptom, Timing/Onset,Context/Setting, Quality, Duration, Modifying Factors, Severity)  Note limiting factors. Sotero Dee is a 22 y.o. female who presents to the emergency department patient complains emergency because of UTI symptoms burning frequency urination discomfort lower abdomen no fever no chills no nausea no vomiting patient history of asthma chronic anemia spinal muscular atrophy exacerbated arthritis no fever no chills    HPI    NursingNotes were reviewed. REVIEW OF SYSTEMS    (2-9 systems for level 4, 10 or more for level 5)     Review of Systems   Constitutional: Negative. Negative for activity change and fever. HENT: Negative for congestion, drooling, facial swelling, mouth sores, nosebleeds, sinus pressure, sore throat, trouble swallowing and voice change. Eyes: Negative for pain, discharge, redness and visual disturbance. Respiratory: Negative for cough, choking, chest tightness, shortness of breath, wheezing and stridor. Cardiovascular: Negative for chest pain, palpitations and leg swelling. Gastrointestinal: Negative for abdominal pain, blood in stool, constipation, diarrhea and vomiting. Endocrine: Negative for cold intolerance, polyphagia and polyuria. Genitourinary: Positive for difficulty urinating, dysuria, frequency, hematuria and urgency. Negative for flank pain and genital sores. Musculoskeletal: Negative for back pain, joint swelling, neck pain and neck stiffness. Skin: Negative for pallor and rash. Neurological: Negative for tremors, seizures, syncope, weakness, numbness and headaches.    Hematological: Negative for adenopathy. Does not bruise/bleed easily. Psychiatric/Behavioral: Negative for agitation, behavioral problems, hallucinations and sleep disturbance. The patient is not hyperactive. All other systems reviewed and are negative. Except as noted above the remainder of the review of systems was reviewed and negative. PAST MEDICAL HISTORY     Past Medical History:   Diagnosis Date    [facility]20[facility]2017    Anemia     pt states she takes iron    Asthma     pt states born with asthma, pt uses albuterol    Rheumatoid arthritis flare ([facility] 2/7/2018    Spinal muscular [facility] 11/20/2017         SURGICALHISTORY     History reviewed. No pertinent surgical history. CURRENT MEDICATIONS       Previous Medications    ACETAMINOPHEN (TYLENOL) 500 MG TABLET    Take 2 tablets by mouth every 6 hours as needed for Pain    ALBUTEROL SULFATE  (90 BASE) MCG/ACT INHALER    Inhale 2 puffs into the lungs every 6 hours as needed for Wheezing Indications: as needed for asthma    DOCUSATE SODIUM (COLACE, DULCOLAX) 100 MG CAPS    Take 100 mg by mouth daily    FERROUS SULFATE (IRON) 325 (65 FE) MG TABS    Take 1 tablet by mouth 2 times daily    IBUPROFEN (ADVIL;MOTRIN) 800 MG TABLET    Take 1 tablet by mouth every 8 hours as needed for Pain    PRENATAL VIT-FE FUMARATE-FA (PRENATAL VITAMIN) 27-0.8 MG TABS    Take 1 tablet by mouth daily       ALLERGIES     Patient has no known allergies.     FAMILY HISTORY       Family History   Problem Relation Age of Onset    Diabetes Maternal Aunt     Arthritis Maternal Grandmother     Diabetes Maternal Grandmother           SOCIAL HISTORY       Social History     Socioeconomic History    Marital status: Single     Spouse name: None    Number of children: None    Years of education: None    Highest education level: None   Occupational History    None   Tobacco Use    Smoking status: Never Smoker    Smokeless tobacco: Never Used   Vaping Use erythema. Eyes:      General:         Right eye: No discharge. Extraocular Movements: Extraocular movements intact. Pupils: Pupils are equal, round, and reactive to light. Cardiovascular:      Rate and Rhythm: Normal rate and regular rhythm. Heart sounds: Normal heart sounds. No murmur heard. No gallop. Pulmonary:      Effort: No respiratory distress. Breath sounds: Normal breath sounds. No wheezing. Abdominal:      General: Bowel sounds are normal.      Palpations: Abdomen is soft. There is no mass. Tenderness: There is no abdominal tenderness. There is no right CVA tenderness, guarding or rebound. Musculoskeletal:         General: No swelling, tenderness or deformity. Normal range of motion. Cervical back: Neck supple. No rigidity. Right lower leg: No edema. Lymphadenopathy:      Cervical: No cervical adenopathy. Skin:     General: Skin is warm. Capillary Refill: Capillary refill takes less than 2 seconds. Findings: No erythema, lesion or rash. Neurological:      Mental Status: She is alert and oriented to person, place, and time. Cranial Nerves: No cranial nerve deficit. Sensory: No sensory deficit. Motor: No weakness or abnormal muscle tone. Coordination: Coordination normal.      Deep Tendon Reflexes: Reflexes normal.   Psychiatric:         Behavior: Behavior normal.         Thought Content:  Thought content normal.         DIAGNOSTIC RESULTS     EKG: All EKG's are interpreted by the Emergency Department Physician who either signs or Co-signsthis chart in the absence of a cardiologist.        RADIOLOGY:   Non-plain filmimages such as CT, Ultrasound and MRI are read by the radiologist. Plain radiographic images are visualized and preliminarily interpreted by the emergency physician with the below findings:        Interpretation per the Radiologist below, if available at the time ofthis note:    No orders to display         ED BEDSIDE ULTRASOUND:   Performed by ED Physician - none    LABS:  Labs Reviewed   URINE RT REFLEX TO CULTURE - Abnormal; Notable for the following components:       Result Value    Protein, UA >=300 (*)     All other components within normal limits   MICROSCOPIC URINALYSIS - Abnormal; Notable for the following components:    RBC, UA 20-50 (*)     Bacteria, UA FEW (*)     All other components within normal limits   CULTURE, URINE   PREGNANCY, URINE       All other labs were within normal range or not returned as of this dictation. EMERGENCY DEPARTMENT COURSE and DIFFERENTIAL DIAGNOSIS/MDM:   Vitals:    Vitals:    09/30/21 1352   BP: 116/75   Pulse: 81   Resp: 16   Temp: 98.1 °F (36.7 °C)   TempSrc: Oral   SpO2: 100%   Weight: 103 lb (46.7 kg)   Height: 5' 2\" (1.575 m)           MDM    CRITICAL CARE TIME   Total Critical Care time was  minutes, excluding separately reportableprocedures. There was a high probability of clinicallysignificant/life threatening deterioration in the patient's condition which required my urgent intervention. ONSULTS:  None    PROCEDURES:  Unless otherwise noted below, none     Procedures    FINAL IMPRESSION      1.  Acute cystitis with hematuria          DISPOSITION/PLAN   DISPOSITION        PATIENT REFERRED TO:  Bud Tony, DO  [address] [facility]  [id number]  [address] [address]  [phone]    In 1 week        DISCHARGE MEDICATIONS:  New Prescriptions    SULFAMETHOXAZOLE-TRIMETHOPRIM (BACTRIM DS) 800-160 MG PER TABLET    Take 1 tablet by mouth 2 times daily for 10 days    SULFAMETHOXAZOLE-TRIMETHOPRIM (BACTRIM DS) 800-160 MG PER TABLET    Take 1 tablet by mouth 2 times daily for 10 days          (Please note that portions of this note were completed with a voice recognition program.  Efforts were made to edit the dictations but occasionally words are mis-transcribed.)    Ayleen De Jesus MD (electronically signed)  Attending Emergency Physician       Ayleen De Jesus MD  09/30/21 6621

## 2021-10-02 LAB — URINE CULTURE, ROUTINE: NORMAL

## 2022-05-05 ENCOUNTER — HOSPITAL ENCOUNTER (EMERGENCY)
Age: 22
Discharge: HOME | End: 2022-05-05
Attending: EMERGENCY MEDICINE
Payer: COMMERCIAL

## 2022-05-05 VITALS
WEIGHT: 98 LBS | DIASTOLIC BLOOD PRESSURE: 72 MMHG | HEIGHT: 62 IN | RESPIRATION RATE: 18 BRPM | BODY MASS INDEX: 18.03 KG/M2 | SYSTOLIC BLOOD PRESSURE: 104 MMHG | HEART RATE: 103 BPM | TEMPERATURE: 98.3 F | OXYGEN SATURATION: 100 %

## 2022-05-05 DIAGNOSIS — S01.81XA FACIAL LACERATION, INITIAL ENCOUNTER: Primary | ICD-10-CM

## 2022-05-05 PROCEDURE — 12011 RPR F/E/E/N/L/M 2.5 CM/<: CPT

## 2022-05-05 PROCEDURE — 99283 EMERGENCY DEPT VISIT LOW MDM: CPT

## 2022-05-05 PROCEDURE — 6370000000 HC RX 637 (ALT 250 FOR IP): Performed by: EMERGENCY MEDICINE

## 2022-05-05 RX ORDER — HYDROCODONE BITARTRATE AND ACETAMINOPHEN 5; 325 MG/1; MG/1
1 TABLET ORAL ONCE
Status: COMPLETED | OUTPATIENT
Start: 2022-05-05 | End: 2022-05-05

## 2022-05-05 RX ADMIN — HYDROCODONE BITARTRATE AND ACETAMINOPHEN 1 TABLET: 5; 325 TABLET ORAL at 18:41

## 2022-05-05 ASSESSMENT — PAIN DESCRIPTION - LOCATION: LOCATION: MOUTH

## 2022-05-05 ASSESSMENT — PAIN DESCRIPTION - ORIENTATION: ORIENTATION: LEFT

## 2022-05-05 ASSESSMENT — PAIN DESCRIPTION - DESCRIPTORS: DESCRIPTORS: BURNING;ACHING;TINGLING

## 2022-05-05 ASSESSMENT — PAIN SCALES - GENERAL: PAINLEVEL_OUTOF10: 8

## 2022-05-05 NOTE — ED PROVIDER NOTES
[facility] ED  EMERGENCY DEPARTMENT ENCOUNTER      Pt Name: Josef Cade  MRN: [id number]  [place] 2000  Date of evaluation: 5/5/2022  Provider: Ranjit Easton DO    CHIEF COMPLAINT       Chief Complaint   Patient presents with    Laceration     Laceration above lip, left side. Injured by cell phone while horseplaying with friend 2 hrs ago     Complaint: Lip laceration  History of chief complaint: 28-year-old female presents the emergency department complaining of a laceration to to her upper lip. Patient states he was tossing her phone and it struck her just beneath the nose on the upper aspect of her lip. Patient states that she did have bleeding resolved with pressure. Patient denies any tooth pain. Patient states her tetanus shot is up-to-date. Nursing Notes were reviewed. REVIEW OF SYSTEMS    (2-9 systems for level 4, 10 or more for level 5)     Review of Systems  Pertinent findings documented in the history of present illness  Except as noted above the remainder of the review of systems was reviewed and negative. PAST MEDICAL HISTORY     Past Medical History:   Diagnosis Date    Abnormal hemoglobin (Hgb) ([facility] 11/20/2017    Anemia     pt states she takes iron    Asthma     pt states born with asthma, pt uses albuterol    Rheumatoid arthritis flare ([facility] 2/7/2018    Spinal muscular atrophy ([facility] 11/20/2017         SURGICAL HISTORY     History reviewed. No pertinent surgical history.       CURRENT MEDICATIONS       Previous Medications    ACETAMINOPHEN (TYLENOL) 500 MG TABLET    Take 2 tablets by mouth every 6 hours as needed for Pain    ALBUTEROL SULFATE  (90 BASE) MCG/ACT INHALER    Inhale 2 puffs into the lungs every 6 hours as needed for Wheezing Indications: as needed for asthma    DOCUSATE SODIUM (COLACE, DULCOLAX) 100 MG CAPS    Take 100 mg by mouth daily    FERROUS SULFATE (IRON) 325 (65 FE) MG TABS    Take 1 tablet by mouth 2 times daily    IBUPROFEN (ADVIL;MOTRIN) 800 MG TABLET    Take 1 tablet by mouth every 8 hours as needed for Pain    PRENATAL VIT-FE FUMARATE-FA (PRENATAL VITAMIN) 27-0.8 MG TABS    Take 1 tablet by mouth daily       ALLERGIES     Patient has no known allergies. FAMILY HISTORY       Family History   Problem Relation Age of Onset    Diabetes Maternal Aunt     Arthritis Maternal Grandmother     Diabetes Maternal Grandmother           SOCIAL HISTORY       Social History     Socioeconomic History    Marital status: Single     Spouse name: None    Number of children: None    Years of education: None    Highest education level: None   Occupational History    None   Tobacco Use    Smoking status: Never Smoker    Smokeless tobacco: Never Used   Vaping Use    Vaping Use: Never used   Substance and Sexual Activity    Alcohol use: Yes     Comment: social    Drug use: No    Sexual activity: Yes     Partners: Male   Other Topics Concern    None   Social History Narrative    None     Social Determinants of Health     Financial Resource Strain:     Difficulty of Paying Living Expenses: Not on file   Food Insecurity:     Worried About Running Out of Food in the Last Year: Not on file    Sally of Food in the Last Year: Not on file   Transportation Needs:     Lack of Transportation (Medical): Not on file    Lack of Transportation (Non-Medical):  Not on file   Physical Activity:     Days of Exercise per Week: Not on file    Minutes of Exercise per Session: Not on file   Stress:     Feeling of Stress : Not on file   Social Connections:     Frequency of Communication with Friends and Family: Not on file    Frequency of Social Gatherings with Friends and Family: Not on file    Attends Jehovah's witness Services: Not on file    Active Member of Clubs or Organizations: Not on file    Attends Club or Organization Meetings: Not on file    Marital Status: Not on file   Intimate Partner Violence:     Fear of Current or Ex-Partner: Not on file  Emotionally Abused: Not on file    Physically Abused: Not on file    Sexually Abused: Not on file   Housing Stability:     Unable to Pay for Housing in the Last Year: Not on file    Number of Places Lived in the Last Year: Not on file    Unstable Housing in the Last Year: Not on file             PHYSICAL EXAM    (up to 7 for level 4, 8 or more for level 5)     ED Triage Vitals [05/05/22 1725]   BP Temp Temp Source Pulse Resp SpO2 Height Weight   104/72 98.3 °F (36.8 °C) Temporal 103 18 100 % 5' 2\" (1.575 m) 98 lb (44.5 kg)       Physical Exam   Normal appearance: Patient is awake alert interactive appropriate nontoxic in no distress. Face: There is a 8 mm vertical linear laceration on the left upper lip with wound margin not gaping, location well above the vermilion border. There is no active bleeding no foreign body. Eversion of the lip reveals a round area of contusion on the inner mucosa no through and through laceration. Teeth are intact face is stable  Neck is supple no adenopathy  Heart is regular rate and rhythm  Lungs are clear to auscultation with equal breast bilaterally        EMERGENCY DEPARTMENT COURSE and DIFFERENTIAL DIAGNOSIS/MDM:   Vitals:    Vitals:    05/05/22 1725   BP: 104/72   Pulse: 103   Resp: 18   Temp: 98.3 °F (36.8 °C)   TempSrc: Temporal   SpO2: 100%   Weight: 98 lb (44.5 kg)   Height: 5' 2\" (1.575 m)     Treatment and course:  Laceration repair performed by ED physician area was cleansed with antibacterial solution 1% lidocaine was injected locally for anesthesia the wound was explored there were no foreign bodies appreciated. Wound irrigated and closed with 2 sutures of 6-0 Ethilon with good approximation of the wound margins. FINAL IMPRESSION      1. Facial laceration, initial encounter          DISPOSITION/PLAN   DISPOSITION    Discharged home with wound care instructions keep the area clean and dry topical antibiotic ointment.   Under closely return if any change or worsening increasing pain redness swelling warmth drainage and to follow-up with primary physician for repeat assessment and suture removal in 5 days. PATIENT REFERRED TO:  Faisal Mcnulty DO  [address] [place] [address]  [id number] [address]  [phone]    In 5 days        DISCHARGE MEDICATIONS:  New Prescriptions    No medications on file     Controlled Substances Monitoring:     No flowsheet data found.     (Please note that portions of this note were completed with a voice recognition program.  Efforts were made to edit the dictations but occasionally words are mis-transcribed.)    Caden Henderson DO (electronically signed)  Attending Emergency Physician            Caden Henderson DO  05/05/22 [id number]

## 2022-05-05 NOTE — ED TRIAGE NOTES
Patient presents to ED with c/o left side upper lip laceration approx 1/4 inch that occurred 2 hours ago after striking herself with a cell phone while horse playing.  Denies any loc and bleeding controlled

## 2022-08-30 ENCOUNTER — OFFICE VISIT (OUTPATIENT)
Dept: OBGYN CLINIC | Age: 22
End: 2022-08-30
Payer: COMMERCIAL

## 2022-08-30 VITALS
DIASTOLIC BLOOD PRESSURE: 68 MMHG | HEIGHT: 62 IN | BODY MASS INDEX: 17.85 KG/M2 | WEIGHT: 97 LBS | HEART RATE: 84 BPM | SYSTOLIC BLOOD PRESSURE: 98 MMHG

## 2022-08-30 DIAGNOSIS — Z11.3 SCREENING FOR STD (SEXUALLY TRANSMITTED DISEASE): ICD-10-CM

## 2022-08-30 DIAGNOSIS — N92.6 IRREGULAR MENSES: ICD-10-CM

## 2022-08-30 DIAGNOSIS — N92.6 IRREGULAR MENSES: Primary | ICD-10-CM

## 2022-08-30 LAB
BILIRUBIN URINE: NEGATIVE
BLOOD, URINE: NEGATIVE
CLARITY: ABNORMAL
COLOR: YELLOW
GLUCOSE URINE: NEGATIVE MG/DL
KETONES, URINE: ABNORMAL MG/DL
LEUKOCYTE ESTERASE, URINE: ABNORMAL
NITRITE, URINE: NEGATIVE
PH UA: 7.5 (ref 5–9)
PROTEIN UA: 30 MG/DL
SPECIFIC GRAVITY UA: 1.03 (ref 1–1.03)
UROBILINOGEN, URINE: 1 E.U./DL

## 2022-08-30 PROCEDURE — G8427 DOCREV CUR MEDS BY ELIG CLIN: HCPCS | Performed by: OBSTETRICS & GYNECOLOGY

## 2022-08-30 PROCEDURE — 99213 OFFICE O/P EST LOW 20 MIN: CPT | Performed by: OBSTETRICS & GYNECOLOGY

## 2022-08-30 PROCEDURE — G8419 CALC BMI OUT NRM PARAM NOF/U: HCPCS | Performed by: OBSTETRICS & GYNECOLOGY

## 2022-08-30 PROCEDURE — 1036F TOBACCO NON-USER: CPT | Performed by: OBSTETRICS & GYNECOLOGY

## 2022-08-30 ASSESSMENT — PATIENT HEALTH QUESTIONNAIRE - PHQ9
1. LITTLE INTEREST OR PLEASURE IN DOING THINGS: 0
SUM OF ALL RESPONSES TO PHQ9 QUESTIONS 1 & 2: 0
SUM OF ALL RESPONSES TO PHQ QUESTIONS 1-9: 0
2. FEELING DOWN, DEPRESSED OR HOPELESS: 0
SUM OF ALL RESPONSES TO PHQ QUESTIONS 1-9: 0

## 2022-08-30 NOTE — PROGRESS NOTES
Shantanu Clark is a 25 y.o. female who presents here today for complaints of Discuss Medications (She states she pulled her Mirena out at home and would like a new one. )    Wanst Mirena IUD replaced. History of irregular menses. .      Vitals:  BP 98/68 (Site: Right Upper Arm, Position: Sitting, Cuff Size: Small Adult)   Pulse 84   Ht 5' 2\" (1.575 m)   Wt 97 lb (44 kg)   BMI 17.74 kg/m²   Allergies:  Patient has no known allergies. Past Medical History:   Diagnosis Date    Abnormal hemoglobin (Hgb) ([facility] 2017    Anemia     pt states she takes iron    Asthma     pt states born with asthma, pt uses albuterol    Rheumatoid arthritis flare ([facility] 2018    Spinal muscular atrophy ([facility] 2017     No past surgical history on file.   OB History          2    Para   2    Term   1       1    AB        Living   2         SAB        IAB        Ectopic        Molar        Multiple   0    Live Births   2              Family History   Problem Relation Age of Onset    Diabetes Maternal Aunt     Arthritis Maternal Grandmother     Diabetes Maternal Grandmother      Social History     Socioeconomic History    Marital status: Single     Spouse name: Not on file    Number of children: Not on file    Years of education: Not on file    Highest education level: Not on file   Occupational History    Not on file   Tobacco Use    Smoking status: Never    Smokeless tobacco: Never   Vaping Use    Vaping Use: Never used   Substance and Sexual Activity    Alcohol use: Yes     Comment: social    Drug use: No    Sexual activity: Yes     Partners: Male   Other Topics Concern    Not on file   Social History Narrative    Not on file     Social Determinants of Health     Financial Resource Strain: Not on file   Food Insecurity: Not on file   Transportation Needs: Not on file   Physical Activity: Not on file   Stress: Not on file   Social Connections: Not on file   Intimate Partner Violence: Not on file   Housing Stability: Not on file       Contraceptive method:  none    Patient's medications, allergies, past medical, surgical, social and family histories were reviewed and updated as appropriate. Review of Systems  As per chief complaint   All other systems reviewed and are negative. Physical Exam:  Vitals:  BP 98/68 (Site: Right Upper Arm, Position: Sitting, Cuff Size: Small Adult)   Pulse 84   Ht 5' 2\" (1.575 m)   Wt 97 lb (44 kg)   BMI 17.74 kg/m²   Lungs: CTAB   Heart : Regular S1/S2, no M/R/G  Abdomen: Soft , NT, ND , + BS   Pelvic exam : deferred    Assessment:      Diagnosis Orders   1. Irregular menses            Plan:     Irregular menses  Mirena IUD ordered       No orders of the defined types were placed in this encounter. No orders of the defined types were placed in this encounter. Follow Up:  No follow-ups on file.         Mainor Girard MD

## 2022-08-31 ENCOUNTER — TELEPHONE (OUTPATIENT)
Dept: OBGYN CLINIC | Age: 22
End: 2022-08-31

## 2022-08-31 LAB
BACTERIA: ABNORMAL /HPF
EPITHELIAL CELLS, UA: ABNORMAL /HPF (ref 0–5)
RBC UA: ABNORMAL /HPF (ref 0–5)
WBC UA: >100 /HPF (ref 0–5)

## 2022-08-31 NOTE — TELEPHONE ENCOUNTER
Delores from [facility] lab called, Trich specimen has been rejected .  Will need to be recollected via wet prep due to a lab error

## 2022-09-01 LAB
ORGANISM: ABNORMAL
URINE CULTURE, ROUTINE: ABNORMAL
URINE CULTURE, ROUTINE: ABNORMAL

## 2022-09-01 NOTE — TELEPHONE ENCOUNTER
[employer] message sent to patient informing her of rejected specimen and she'll need cultures done if she wants trichomonas testing.

## 2022-09-02 RX ORDER — METRONIDAZOLE 500 MG/1
500 TABLET ORAL 2 TIMES DAILY
Qty: 14 TABLET | Refills: 0 | Status: SHIPPED | OUTPATIENT
Start: 2022-09-02 | End: 2022-09-09

## 2022-09-02 RX ORDER — CEPHALEXIN 500 MG/1
500 CAPSULE ORAL 4 TIMES DAILY
Qty: 40 CAPSULE | Refills: 0 | Status: SHIPPED | OUTPATIENT
Start: 2022-09-02

## 2022-09-02 NOTE — TELEPHONE ENCOUNTER
I sent empiric antibiotics Flagyl 500 mg p.o. twice daily for 1 week in case the patient does not want to be retested for the trichomonas

## 2022-09-08 LAB
C. TRACHOMATIS DNA ,URINE: NEGATIVE
N. GONORRHOEAE DNA, URINE: NEGATIVE
REASON FOR REJECTION: NORMAL
REJECTED TEST: 5506

## 2022-10-12 ENCOUNTER — TELEPHONE (OUTPATIENT)
Dept: OBGYN CLINIC | Age: 22
End: 2022-10-12

## 2022-10-12 NOTE — TELEPHONE ENCOUNTER
Unable to leave message , vm full. Pt missed IUD insertion appt. Calling to  see if she would like to rs.

## 2023-04-30 ENCOUNTER — HOSPITAL ENCOUNTER (EMERGENCY)
Age: 23
Discharge: HOME | End: 2023-04-30
Payer: COMMERCIAL

## 2023-04-30 ENCOUNTER — APPOINTMENT (OUTPATIENT)
Dept: GENERAL RADIOLOGY | Age: 23
End: 2023-04-30
Payer: COMMERCIAL

## 2023-04-30 VITALS
WEIGHT: 97 LBS | RESPIRATION RATE: 18 BRPM | SYSTOLIC BLOOD PRESSURE: 153 MMHG | OXYGEN SATURATION: 100 % | TEMPERATURE: 98.1 F | HEART RATE: 105 BPM | DIASTOLIC BLOOD PRESSURE: 77 MMHG | BODY MASS INDEX: 17.74 KG/M2

## 2023-04-30 DIAGNOSIS — S93.601A SPRAIN OF RIGHT FOOT, INITIAL ENCOUNTER: Primary | ICD-10-CM

## 2023-04-30 PROCEDURE — 6370000000 HC RX 637 (ALT 250 FOR IP): Performed by: PHYSICIAN ASSISTANT

## 2023-04-30 PROCEDURE — 99283 EMERGENCY DEPT VISIT LOW MDM: CPT

## 2023-04-30 PROCEDURE — 73630 X-RAY EXAM OF FOOT: CPT

## 2023-04-30 RX ORDER — IBUPROFEN 600 MG/1
600 TABLET ORAL ONCE
Status: COMPLETED | OUTPATIENT
Start: 2023-04-30 | End: 2023-04-30

## 2023-04-30 RX ORDER — NAPROXEN 500 MG/1
500 TABLET ORAL 2 TIMES DAILY
Qty: 10 TABLET | Refills: 0 | Status: SHIPPED | OUTPATIENT
Start: 2023-04-30 | End: 2023-05-05

## 2023-04-30 RX ADMIN — IBUPROFEN 600 MG: 600 TABLET, FILM COATED ORAL at 08:06

## 2023-04-30 ASSESSMENT — ENCOUNTER SYMPTOMS
COLOR CHANGE: 0
SHORTNESS OF BREATH: 0
SORE THROAT: 0
CONSTIPATION: 0
ABDOMINAL DISTENTION: 0
RHINORRHEA: 0
EYE DISCHARGE: 0
ABDOMINAL PAIN: 0

## 2023-04-30 ASSESSMENT — PAIN SCALES - GENERAL
PAINLEVEL_OUTOF10: 10
PAINLEVEL_OUTOF10: 10

## 2023-04-30 ASSESSMENT — PAIN DESCRIPTION - LOCATION: LOCATION: ANKLE

## 2023-04-30 ASSESSMENT — PAIN DESCRIPTION - ORIENTATION: ORIENTATION: RIGHT

## 2023-04-30 ASSESSMENT — PAIN - FUNCTIONAL ASSESSMENT: PAIN_FUNCTIONAL_ASSESSMENT: 0-10

## 2023-04-30 ASSESSMENT — PAIN DESCRIPTION - DESCRIPTORS: DESCRIPTORS: ACHING;STABBING

## 2023-11-22 ENCOUNTER — HOSPITAL ENCOUNTER (EMERGENCY)
Age: 23
Discharge: HOME | End: 2023-11-22
Payer: COMMERCIAL

## 2023-11-22 VITALS
HEIGHT: 62 IN | OXYGEN SATURATION: 96 % | DIASTOLIC BLOOD PRESSURE: 67 MMHG | HEART RATE: 73 BPM | WEIGHT: 100 LBS | TEMPERATURE: 97.5 F | SYSTOLIC BLOOD PRESSURE: 106 MMHG | BODY MASS INDEX: 18.4 KG/M2 | RESPIRATION RATE: 16 BRPM

## 2023-11-22 DIAGNOSIS — H72.92 PERFORATION OF LEFT TYMPANIC MEMBRANE: Primary | ICD-10-CM

## 2023-11-22 DIAGNOSIS — T16.2XXA FOREIGN BODY OF LEFT EAR, INITIAL ENCOUNTER: ICD-10-CM

## 2023-11-22 PROCEDURE — 99283 EMERGENCY DEPT VISIT LOW MDM: CPT

## 2023-11-22 RX ORDER — AMOXICILLIN AND CLAVULANATE POTASSIUM 875; 125 MG/1; MG/1
1 TABLET, FILM COATED ORAL 2 TIMES DAILY
Qty: 14 TABLET | Refills: 0 | Status: SHIPPED | OUTPATIENT
Start: 2023-11-22 | End: 2023-11-29

## 2023-11-22 RX ORDER — CIPROFLOXACIN AND DEXAMETHASONE 3; 1 MG/ML; MG/ML
4 SUSPENSION/ DROPS AURICULAR (OTIC) 2 TIMES DAILY
Qty: 1 EACH | Refills: 0 | Status: SHIPPED | OUTPATIENT
Start: 2023-11-22 | End: 2023-11-29

## 2023-11-22 RX ORDER — TRAMADOL HYDROCHLORIDE 50 MG/1
50 TABLET ORAL EVERY 8 HOURS PRN
Qty: 12 TABLET | Refills: 0 | Status: SHIPPED | OUTPATIENT
Start: 2023-11-22 | End: 2023-11-26

## 2023-11-22 ASSESSMENT — ENCOUNTER SYMPTOMS
VOICE CHANGE: 0
ANAL BLEEDING: 0
NAUSEA: 0
BACK PAIN: 0
VOMITING: 0
EYE DISCHARGE: 0
APNEA: 0
ABDOMINAL DISTENTION: 0

## 2023-11-22 ASSESSMENT — PAIN SCALES - GENERAL: PAINLEVEL_OUTOF10: 10

## 2023-11-22 ASSESSMENT — LIFESTYLE VARIABLES
HOW MANY STANDARD DRINKS CONTAINING ALCOHOL DO YOU HAVE ON A TYPICAL DAY: PATIENT DOES NOT DRINK
HOW OFTEN DO YOU HAVE A DRINK CONTAINING ALCOHOL: NEVER

## 2023-11-22 ASSESSMENT — PAIN DESCRIPTION - LOCATION: LOCATION: EAR

## 2023-11-22 ASSESSMENT — PAIN DESCRIPTION - DESCRIPTORS: DESCRIPTORS: ACHING

## 2023-11-22 ASSESSMENT — PAIN - FUNCTIONAL ASSESSMENT: PAIN_FUNCTIONAL_ASSESSMENT: 0-10

## 2023-11-22 ASSESSMENT — PAIN DESCRIPTION - ORIENTATION: ORIENTATION: LEFT

## 2023-11-22 ASSESSMENT — PAIN DESCRIPTION - PAIN TYPE: TYPE: ACUTE PAIN

## 2023-11-23 NOTE — ED PROVIDER NOTES
[facility] ED  EMERGENCY DEPARTMENT ENCOUNTER      Pt Name: Karen Bain  MRN: [id number]  [address] [facility] 2000  Date of evaluation: 11/22/2023  Provider: Raul Leung PA-C  8:23 PM EST    CHIEF COMPLAINT       Chief Complaint   Patient presents with    Foreign Body in Ear     Left ear pain, stabbed ear with wooden stick         HISTORY OF PRESENT ILLNESS   (Location/Symptom, Timing/Onset, Context/Setting, Quality, Duration, Modifying Factors, Severity)  Note limiting factors. Karen Bain is a 26 y.o. female who presents to the emergency department patient had an incense stick in her ear and bumped into the wall she states it feels as if there is something still in there she is attempted to use a Dimitry pin to remove it states she can feel a clicking around inside. She states she has decreased hearing on her left side when she tries to \"pop her eardrums\" she states it feels like air rustling on the left side she denies fever chills nausea vomiting. HPI    Nursing Notes were reviewed. REVIEW OF SYSTEMS    (2-9 systems for level 4, 10 or more for level 5)     Review of Systems   Constitutional:  Negative for activity change, appetite change, fever and unexpected weight change. HENT:  Positive for ear pain and hearing loss. Negative for congestion, nosebleeds and voice change. Eyes:  Negative for discharge. Respiratory:  Negative for apnea. Cardiovascular:  Negative for chest pain. Gastrointestinal:  Negative for abdominal distention, anal bleeding, nausea and vomiting. Genitourinary:  Negative for dysuria and hematuria. Musculoskeletal:  Negative for back pain and joint swelling. Skin:  Negative for pallor. Neurological:  Negative for seizures and facial asymmetry. Hematological:  Does not bruise/bleed easily. Psychiatric/Behavioral:  Negative for behavioral problems, self-injury and sleep disturbance. All other systems reviewed and are negative.       Except as Depth Of Biopsy: dermis

## 2023-11-23 NOTE — ED TRIAGE NOTES
Patient c/o left ear pain, states stabbed self in ear with wooden stick by accident. Unsure if foreign object still in ear.

## 2023-11-23 NOTE — DISCHARGE INSTRUCTIONS
Follow-up with ear nose and throat specialist on Monday. Do not take any medications including aspirin or ibuprofen. Do not drive or operate while taking tramadol as it may cause drowsiness. May take Tylenol as directed on packaging for pain.

## 2023-11-30 ENCOUNTER — OFFICE VISIT (OUTPATIENT)
Dept: FAMILY MEDICINE CLINIC | Age: 23
End: 2023-11-30
Payer: COMMERCIAL

## 2023-11-30 VITALS
TEMPERATURE: 98.1 F | SYSTOLIC BLOOD PRESSURE: 116 MMHG | OXYGEN SATURATION: 93 % | HEIGHT: 60 IN | DIASTOLIC BLOOD PRESSURE: 66 MMHG | HEART RATE: 80 BPM | WEIGHT: 101.8 LBS | BODY MASS INDEX: 19.99 KG/M2 | RESPIRATION RATE: 16 BRPM

## 2023-11-30 DIAGNOSIS — J45.21 MILD INTERMITTENT ASTHMA WITH EXACERBATION: Primary | ICD-10-CM

## 2023-11-30 DIAGNOSIS — H72.92 RUPTURE OF LEFT TYMPANIC MEMBRANE: ICD-10-CM

## 2023-11-30 PROCEDURE — 1036F TOBACCO NON-USER: CPT | Performed by: NURSE PRACTITIONER

## 2023-11-30 PROCEDURE — G8427 DOCREV CUR MEDS BY ELIG CLIN: HCPCS | Performed by: NURSE PRACTITIONER

## 2023-11-30 PROCEDURE — 99213 OFFICE O/P EST LOW 20 MIN: CPT | Performed by: NURSE PRACTITIONER

## 2023-11-30 PROCEDURE — G8420 CALC BMI NORM PARAMETERS: HCPCS | Performed by: NURSE PRACTITIONER

## 2023-11-30 PROCEDURE — G8484 FLU IMMUNIZE NO ADMIN: HCPCS | Performed by: NURSE PRACTITIONER

## 2023-11-30 RX ORDER — OFLOXACIN 3 MG/ML
5 SOLUTION AURICULAR (OTIC) 2 TIMES DAILY
Qty: 5 ML | Refills: 0 | Status: SHIPPED | OUTPATIENT
Start: 2023-11-30 | End: 2023-12-10

## 2023-11-30 RX ORDER — ALBUTEROL SULFATE 2.5 MG/3ML
2.5 SOLUTION RESPIRATORY (INHALATION) 4 TIMES DAILY PRN
Qty: 120 EACH | Refills: 1 | Status: SHIPPED | OUTPATIENT
Start: 2023-11-30

## 2023-11-30 RX ORDER — ALBUTEROL SULFATE 90 UG/1
2 AEROSOL, METERED RESPIRATORY (INHALATION) 2 TIMES DAILY
Qty: 36 G | Refills: 1 | Status: SHIPPED | OUTPATIENT
Start: 2023-11-30

## 2023-11-30 RX ORDER — PREDNISONE 20 MG/1
40 TABLET ORAL DAILY
Qty: 10 TABLET | Refills: 0 | Status: SHIPPED | OUTPATIENT
Start: 2023-11-30 | End: 2023-12-05

## 2023-11-30 SDOH — ECONOMIC STABILITY: INCOME INSECURITY: HOW HARD IS IT FOR YOU TO PAY FOR THE VERY BASICS LIKE FOOD, HOUSING, MEDICAL CARE, AND HEATING?: SOMEWHAT HARD

## 2023-11-30 SDOH — ECONOMIC STABILITY: FOOD INSECURITY: WITHIN THE PAST 12 MONTHS, YOU WORRIED THAT YOUR FOOD WOULD RUN OUT BEFORE YOU GOT MONEY TO BUY MORE.: OFTEN TRUE

## 2023-11-30 SDOH — ECONOMIC STABILITY: HOUSING INSECURITY
IN THE LAST 12 MONTHS, WAS THERE A TIME WHEN YOU DID NOT HAVE A STEADY PLACE TO SLEEP OR SLEPT IN A SHELTER (INCLUDING NOW)?: NO

## 2023-11-30 SDOH — ECONOMIC STABILITY: FOOD INSECURITY: WITHIN THE PAST 12 MONTHS, THE FOOD YOU BOUGHT JUST DIDN'T LAST AND YOU DIDN'T HAVE MONEY TO GET MORE.: NEVER TRUE

## 2023-11-30 ASSESSMENT — ENCOUNTER SYMPTOMS
SPUTUM PRODUCTION: 0
TROUBLE SWALLOWING: 0
RHINORRHEA: 1
FREQUENT THROAT CLEARING: 0
SORE THROAT: 0
COUGH: 1
COLOR CHANGE: 0
HEMOPTYSIS: 0
ABDOMINAL DISTENTION: 0
SHORTNESS OF BREATH: 1
NAUSEA: 0
BACK PAIN: 0
WHEEZING: 1
HOARSE VOICE: 0
HEARTBURN: 0
ABDOMINAL PAIN: 0
DIARRHEA: 0
DIFFICULTY BREATHING: 0
VOMITING: 0
CONSTIPATION: 0
CHEST TIGHTNESS: 1

## 2023-11-30 ASSESSMENT — PATIENT HEALTH QUESTIONNAIRE - PHQ9
SUM OF ALL RESPONSES TO PHQ QUESTIONS 1-9: 0
1. LITTLE INTEREST OR PLEASURE IN DOING THINGS: 0
SUM OF ALL RESPONSES TO PHQ QUESTIONS 1-9: 0

## 2023-11-30 NOTE — PROGRESS NOTES
[facility]       Chief Complaint   Patient presents with    Ear Injury     Left ear has a piece of wood stuck in it and was seen in the ER x1 wk . Asthma attack x3 in past few days        Nurses Notes reviewed and I agree except as noted in the HPI. HISTORY OF PRESENT ILLNESS   Alissa Archer is a 26 y.o. female who presents   Asthma  She complains of chest tightness, cough, shortness of breath and wheezing. There is no difficulty breathing, frequent throat clearing, hemoptysis, hoarse voice or sputum production. This is a new problem. The current episode started in the past 7 days. The problem occurs constantly. The problem has been gradually worsening. The cough is non-productive, hacking and harsh. Associated symptoms include ear pain (had recent injury to left ear ruptured TM), nasal congestion, postnasal drip and rhinorrhea. Pertinent negatives include no appetite change, chest pain, dyspnea on exertion, ear congestion, fever, headaches, heartburn, malaise/fatigue, myalgias, orthopnea, PND, sneezing, sore throat, sweats, trouble swallowing or weight loss. Her symptoms are aggravated by any activity. Her symptoms are alleviated by rest. She reports minimal improvement on treatment. Her past medical history is significant for asthma. There is no history of bronchiectasis, bronchitis, COPD, emphysema or pneumonia. REVIEW OF SYSTEMS     Review of Systems   Constitutional:  Negative for activity change, appetite change, chills, diaphoresis, fatigue, fever, malaise/fatigue and weight loss. HENT:  Positive for congestion, ear pain (had recent injury to left ear ruptured TM), postnasal drip and rhinorrhea. Negative for hoarse voice, sneezing, sore throat and trouble swallowing. Eyes:  Negative for visual disturbance. Respiratory:  Positive for cough, chest tightness, shortness of breath and wheezing.  Negative for hemoptysis and sputum

## 2023-12-07 ENCOUNTER — OFFICE VISIT (OUTPATIENT)
Age: 23
End: 2023-12-07
Payer: COMMERCIAL

## 2023-12-07 VITALS
HEIGHT: 62 IN | BODY MASS INDEX: 18.58 KG/M2 | TEMPERATURE: 97.6 F | SYSTOLIC BLOOD PRESSURE: 114 MMHG | WEIGHT: 101 LBS | DIASTOLIC BLOOD PRESSURE: 72 MMHG

## 2023-12-07 DIAGNOSIS — H72.92 EARDRUM RUPTURE, LEFT: Primary | ICD-10-CM

## 2023-12-07 DIAGNOSIS — J30.9 ALLERGIC RHINITIS, UNSPECIFIED SEASONALITY, UNSPECIFIED TRIGGER: ICD-10-CM

## 2023-12-07 PROCEDURE — 1036F TOBACCO NON-USER: CPT | Performed by: STUDENT IN AN ORGANIZED HEALTH CARE EDUCATION/TRAINING PROGRAM

## 2023-12-07 PROCEDURE — 99203 OFFICE O/P NEW LOW 30 MIN: CPT | Performed by: STUDENT IN AN ORGANIZED HEALTH CARE EDUCATION/TRAINING PROGRAM

## 2023-12-07 PROCEDURE — 92504 EAR MICROSCOPY EXAMINATION: CPT | Performed by: STUDENT IN AN ORGANIZED HEALTH CARE EDUCATION/TRAINING PROGRAM

## 2023-12-07 PROCEDURE — G8419 CALC BMI OUT NRM PARAM NOF/U: HCPCS | Performed by: STUDENT IN AN ORGANIZED HEALTH CARE EDUCATION/TRAINING PROGRAM

## 2023-12-07 PROCEDURE — G8484 FLU IMMUNIZE NO ADMIN: HCPCS | Performed by: STUDENT IN AN ORGANIZED HEALTH CARE EDUCATION/TRAINING PROGRAM

## 2023-12-07 PROCEDURE — G8427 DOCREV CUR MEDS BY ELIG CLIN: HCPCS | Performed by: STUDENT IN AN ORGANIZED HEALTH CARE EDUCATION/TRAINING PROGRAM

## 2023-12-07 RX ORDER — CETIRIZINE HYDROCHLORIDE 10 MG/1
10 TABLET ORAL DAILY
Qty: 90 TABLET | Refills: 1 | Status: SHIPPED | OUTPATIENT
Start: 2023-12-07

## 2023-12-07 RX ORDER — ECHINACEA PURPUREA EXTRACT 125 MG
2 TABLET ORAL 3 TIMES DAILY
Qty: 1 EACH | Refills: 3 | Status: SHIPPED | OUTPATIENT
Start: 2023-12-07

## 2023-12-07 RX ORDER — FLUTICASONE PROPIONATE 50 MCG
2 SPRAY, SUSPENSION (ML) NASAL DAILY
Qty: 16 G | Refills: 0 | Status: SHIPPED | OUTPATIENT
Start: 2023-12-07

## 2023-12-07 NOTE — PROGRESS NOTES
[address] [place] [address]  [address] [place] [address],Fourth [address] [id number]  Dept: [phone]  Dept Fax: [phone] : [phone]     2023    Visit type: New patient    Reason for Visit: Ear Problem (Ruptured left tympanic membrane)       ASSESSMENT/PLAN   1. Eardrum rupture, left  Assessment & Plan:  Status post traumatic eardrum rupture after inserting wooden stick into ear. Tympanic membrane appears healed today, no sign of infection, no sign of foreign body. Patient reports hearing has improved, but might not be back to baseline. Endorses occasional plugged/popping feeling. No further eardrops necessary  Avoid placing anything inside ear to scratch    Consider hearing test after next follow-up if hearing does not return to baseline. 2. Allergic rhinitis, unspecified seasonality, unspecified trigger  Assessment & Plan:  Nasal congestion/drainage  Recommend Flonase/nasal saline/Zyrtec to control allergic symptoms. No follow-ups on file. Orders Placed This Encounter   Medications    fluticasone (FLONASE) 50 MCG/ACT nasal spray     Si sprays by Each Nostril route daily     Dispense:  16 g     Refill:  0    sodium chloride (ALTAMIST SPRAY) 0.65 % nasal spray     Si sprays by Nasal route in the morning, at noon, and at bedtime     Dispense:  1 each     Refill:  3    cetirizine (ZYRTEC) 10 MG tablet     Sig: Take 1 tablet by mouth daily     Dispense:  90 tablet     Refill:  1            Subjective    Patient: Courtney Pinzon is a 26 y.o. female   HPI:    Referred by: Laurie daniels for evaluation of eardrum. Presented to ED approximately 2 weeks ago with a ruptured eardrum following inserting a wooden stick into her ear. She states that a piece of the wood broke off, and there was concern that there was a foreign body behind the eardrum.   She noted that her her hearing initially decreased

## 2023-12-07 NOTE — ASSESSMENT & PLAN NOTE
Status post traumatic eardrum rupture after inserting wooden stick into ear. Tympanic membrane appears healed today, no sign of infection, no sign of foreign body. Patient reports hearing has improved, but might not be back to baseline. Endorses occasional plugged/popping feeling. No further eardrops necessary  Avoid placing anything inside ear to scratch    Consider hearing test after next follow-up if hearing does not return to baseline.

## 2024-07-09 ENCOUNTER — APPOINTMENT (OUTPATIENT)
Dept: RADIOLOGY | Facility: HOSPITAL | Age: 24
End: 2024-07-09
Payer: MEDICARE

## 2024-07-09 ENCOUNTER — HOSPITAL ENCOUNTER (EMERGENCY)
Facility: HOSPITAL | Age: 24
Discharge: HOME | End: 2024-07-09
Payer: MEDICARE

## 2024-07-09 VITALS
OXYGEN SATURATION: 97 % | BODY MASS INDEX: 17.85 KG/M2 | RESPIRATION RATE: 18 BRPM | HEART RATE: 125 BPM | SYSTOLIC BLOOD PRESSURE: 121 MMHG | DIASTOLIC BLOOD PRESSURE: 58 MMHG | HEIGHT: 62 IN | WEIGHT: 97 LBS | TEMPERATURE: 99.1 F

## 2024-07-09 DIAGNOSIS — N76.0 ACUTE VAGINITIS: ICD-10-CM

## 2024-07-09 DIAGNOSIS — S80.12XA CONTUSION OF LEFT LOWER EXTREMITY, INITIAL ENCOUNTER: Primary | ICD-10-CM

## 2024-07-09 DIAGNOSIS — S70.02XA CONTUSION OF LEFT HIP, INITIAL ENCOUNTER: ICD-10-CM

## 2024-07-09 DIAGNOSIS — Z11.3 ENCOUNTER FOR SCREENING EXAMINATION FOR SEXUALLY TRANSMITTED DISEASE: ICD-10-CM

## 2024-07-09 LAB
APPEARANCE UR: CLEAR
BACTERIA #/AREA URNS AUTO: ABNORMAL /HPF
BILIRUB UR STRIP.AUTO-MCNC: NEGATIVE MG/DL
COLOR UR: YELLOW
GLUCOSE UR STRIP.AUTO-MCNC: NORMAL MG/DL
HCG UR QL IA.RAPID: NEGATIVE
KETONES UR STRIP.AUTO-MCNC: ABNORMAL MG/DL
LEUKOCYTE ESTERASE UR QL STRIP.AUTO: ABNORMAL
MUCOUS THREADS #/AREA URNS AUTO: ABNORMAL /LPF
NITRITE UR QL STRIP.AUTO: NEGATIVE
PH UR STRIP.AUTO: 5.5 [PH]
PROT UR STRIP.AUTO-MCNC: ABNORMAL MG/DL
RBC # UR STRIP.AUTO: NEGATIVE /UL
RBC #/AREA URNS AUTO: ABNORMAL /HPF
SP GR UR STRIP.AUTO: 1.03
SQUAMOUS #/AREA URNS AUTO: ABNORMAL /HPF
UROBILINOGEN UR STRIP.AUTO-MCNC: NORMAL MG/DL
WBC #/AREA URNS AUTO: ABNORMAL /HPF

## 2024-07-09 PROCEDURE — 99283 EMERGENCY DEPT VISIT LOW MDM: CPT

## 2024-07-09 PROCEDURE — 87205 SMEAR GRAM STAIN: CPT | Mod: ELYLAB | Performed by: NURSE PRACTITIONER

## 2024-07-09 PROCEDURE — 87086 URINE CULTURE/COLONY COUNT: CPT | Mod: ELYLAB | Performed by: NURSE PRACTITIONER

## 2024-07-09 PROCEDURE — 2500000002 HC RX 250 W HCPCS SELF ADMINISTERED DRUGS (ALT 637 FOR MEDICARE OP, ALT 636 FOR OP/ED): Performed by: NURSE PRACTITIONER

## 2024-07-09 PROCEDURE — 87661 TRICHOMONAS VAGINALIS AMPLIF: CPT | Mod: ELYLAB | Performed by: NURSE PRACTITIONER

## 2024-07-09 PROCEDURE — 81025 URINE PREGNANCY TEST: CPT | Performed by: NURSE PRACTITIONER

## 2024-07-09 PROCEDURE — 81001 URINALYSIS AUTO W/SCOPE: CPT | Performed by: NURSE PRACTITIONER

## 2024-07-09 PROCEDURE — 73590 X-RAY EXAM OF LOWER LEG: CPT | Mod: LT

## 2024-07-09 PROCEDURE — 2500000004 HC RX 250 GENERAL PHARMACY W/ HCPCS (ALT 636 FOR OP/ED): Performed by: NURSE PRACTITIONER

## 2024-07-09 PROCEDURE — 73590 X-RAY EXAM OF LOWER LEG: CPT | Mod: LEFT SIDE | Performed by: RADIOLOGY

## 2024-07-09 PROCEDURE — 87491 CHLMYD TRACH DNA AMP PROBE: CPT | Mod: ELYLAB | Performed by: NURSE PRACTITIONER

## 2024-07-09 PROCEDURE — 96372 THER/PROPH/DIAG INJ SC/IM: CPT | Performed by: NURSE PRACTITIONER

## 2024-07-09 RX ORDER — CEFTRIAXONE 500 MG/1
500 INJECTION, POWDER, FOR SOLUTION INTRAMUSCULAR; INTRAVENOUS ONCE
Status: COMPLETED | OUTPATIENT
Start: 2024-07-09 | End: 2024-07-09

## 2024-07-09 RX ORDER — IBUPROFEN 600 MG/1
600 TABLET ORAL EVERY 6 HOURS PRN
Qty: 24 TABLET | Refills: 0 | Status: SHIPPED | OUTPATIENT
Start: 2024-07-09

## 2024-07-09 RX ORDER — METRONIDAZOLE 500 MG/1
500 TABLET ORAL 2 TIMES DAILY
Qty: 14 TABLET | Refills: 0 | Status: SHIPPED | OUTPATIENT
Start: 2024-07-09 | End: 2024-07-16

## 2024-07-09 RX ORDER — AZITHROMYCIN 250 MG/1
1000 TABLET, FILM COATED ORAL ONCE
Status: COMPLETED | OUTPATIENT
Start: 2024-07-09 | End: 2024-07-09

## 2024-07-09 ASSESSMENT — PAIN SCALES - GENERAL: PAINLEVEL_OUTOF10: 7

## 2024-07-09 ASSESSMENT — PAIN DESCRIPTION - ORIENTATION: ORIENTATION: LEFT

## 2024-07-09 ASSESSMENT — LIFESTYLE VARIABLES
HAVE PEOPLE ANNOYED YOU BY CRITICIZING YOUR DRINKING: NO
HAVE YOU EVER FELT YOU SHOULD CUT DOWN ON YOUR DRINKING: NO
TOTAL SCORE: 0
EVER FELT BAD OR GUILTY ABOUT YOUR DRINKING: NO
EVER HAD A DRINK FIRST THING IN THE MORNING TO STEADY YOUR NERVES TO GET RID OF A HANGOVER: NO

## 2024-07-09 ASSESSMENT — PAIN DESCRIPTION - PAIN TYPE: TYPE: ACUTE PAIN

## 2024-07-09 ASSESSMENT — PAIN - FUNCTIONAL ASSESSMENT: PAIN_FUNCTIONAL_ASSESSMENT: 0-10

## 2024-07-09 ASSESSMENT — PAIN DESCRIPTION - LOCATION: LOCATION: LEG

## 2024-07-09 NOTE — ED PROVIDER NOTES
HPI   Chief Complaint   Patient presents with    Hip Pain     Left leg and shin pain from off a 5th floor balcony  on 6/17       23-year-old female presents emergency department, states a couple weeks ago she fell off a balcony, injuring her left hip and left leg.  States that she was arrested at that time, had x-rays performed of left knee and ankle that were unremarkable.  Patient states pain in her anterior shin, states it feels unusual to her and she is concerned she may have fractured it and it is healing wrong.  She also mentions pain to her pelvis, groin area, especially with range of motion of the hip.  States the hip x-ray was unremarkable.    Patient is also requesting STI testing and treatment as well as treatment for BV, describes vaginal discharge and irritation similar to previous BV infections      History provided by:  Patient   used: No                        Anish Coma Scale Score: 15                     Patient History   Past Medical History:   Diagnosis Date    Other conditions influencing health status 01/29/2019    History of pregnancy    Other specified health status 01/09/2018    No pertinent past surgical history    Personal history of other infectious and parasitic diseases 01/09/2018    History of chlamydia infection     Past Surgical History:   Procedure Laterality Date    CT HEAD ANGIO W AND WO IV CONTRAST  8/12/2023    CT HEAD ANGIO W AND WO IV CONTRAST 8/12/2023 [facility]     No family history on file.  Social History     Tobacco Use    Smoking status: Not on file    Smokeless tobacco: Not on file   Substance Use Topics    Alcohol use: Not on file    Drug use: Not on file       Physical Exam   ED Triage Vitals [07/09/24 1324]   Temperature Heart Rate Respirations BP   37.3 °C (99.1 °F) (!) 125 18 121/58      Pulse Ox Temp Source Heart Rate Source Patient Position   97 % Temporal Monitor Sitting      BP Location FiO2 (%)     Right arm --       Physical Exam  Physical  Exam:  Constitutional: Vitals noted, no distress. Afebrile.   Cardiovascular: Regular, rate, rhythm, no murmur.   Pulmonary: Lungs clear bilaterally with good aeration. No adventitious breath sounds.   Gastrointestinal: Soft, nonsurgical. Nontender. No peritoneal signs. Normoactive bowel sounds.   Musculoskeletal: Linear laceration/abrasion present to the anterior aspect of this shin, is scabbed.  There is some ecchymosis medial ankle and foot, although nontender over the ankle and foot.  Some discomfort with range of motion of the left hip at the left groin.  Neurovascularly intact distally.  Skin: No rash.   Neuro: No focal neurologic deficits, NIH score of 0.  ED Course & MDM   Diagnoses as of 07/09/24 1508   Contusion of left lower extremity, initial encounter   Contusion of left hip, initial encounter   Encounter for screening examination for sexually transmitted disease   Acute vaginitis       Medical Decision Making  X-ray left tib-fib obtained.  I did discuss CT imaging of the pelvis to rule out occult fracture, the patient would prefer to be referred to orthopedics for further investigation of this.    GC/chlamydia culture, vaginitis panel, swab for trichomonas obtained, patient will be given Rocephin and Zithromax in the department, also treated for BV at her request.  Procedure  Procedures     Chichi Sands, RADHA-CNP  07/09/24 150

## 2024-07-10 ENCOUNTER — HOSPITAL ENCOUNTER (EMERGENCY)
Age: 24
Discharge: HOME | End: 2024-07-10
Payer: COMMERCIAL

## 2024-07-10 VITALS
DIASTOLIC BLOOD PRESSURE: 69 MMHG | HEART RATE: 103 BPM | OXYGEN SATURATION: 100 % | SYSTOLIC BLOOD PRESSURE: 112 MMHG | RESPIRATION RATE: 20 BRPM | WEIGHT: 100 LBS | TEMPERATURE: 98 F | BODY MASS INDEX: 18.29 KG/M2

## 2024-07-10 DIAGNOSIS — T40.601A OPIATE OVERDOSE, ACCIDENTAL OR UNINTENTIONAL, INITIAL ENCOUNTER (HCC): Primary | ICD-10-CM

## 2024-07-10 LAB
AMPHET UR QL SCN: ABNORMAL
BACTERIA UR CULT: NORMAL
BARBITURATES UR QL SCN: ABNORMAL
BENZODIAZ UR QL SCN: ABNORMAL
C TRACH RRNA SPEC QL NAA+PROBE: NEGATIVE
CANNABINOIDS UR QL SCN: POSITIVE
CLUE CELLS VAG LPF-#/AREA: PRESENT /[LPF]
COCAINE UR QL SCN: ABNORMAL
DRUG SCREEN COMMENT UR-IMP: ABNORMAL
FENTANYL SCREEN, URINE: POSITIVE
HCG, URINE, POC: NEGATIVE
HOLD SPECIMEN: NORMAL
Lab: NORMAL
METHADONE UR QL SCN: ABNORMAL
N GONORRHOEA DNA SPEC QL PROBE+SIG AMP: NEGATIVE
NEGATIVE QC PASS/FAIL: NORMAL
NUGENT SCORE: 9
OPIATES UR QL SCN: POSITIVE
OXYCODONE UR QL SCN: ABNORMAL
PCP UR QL SCN: ABNORMAL
POSITIVE QC PASS/FAIL: NORMAL
PROPOXYPH UR QL SCN: ABNORMAL
T VAGINALIS RRNA SPEC QL NAA+PROBE: NEGATIVE
YEAST VAG WET PREP-#/AREA: ABNORMAL

## 2024-07-10 PROCEDURE — 80307 DRUG TEST PRSMV CHEM ANLYZR: CPT

## 2024-07-10 PROCEDURE — 99283 EMERGENCY DEPT VISIT LOW MDM: CPT

## 2024-07-10 ASSESSMENT — ENCOUNTER SYMPTOMS
COLOR CHANGE: 0
SHORTNESS OF BREATH: 0
NAUSEA: 0
ABDOMINAL PAIN: 0
SORE THROAT: 0
COUGH: 0
BLOOD IN STOOL: 0
DIARRHEA: 0
VOMITING: 0
RHINORRHEA: 0

## 2024-07-10 ASSESSMENT — PAIN - FUNCTIONAL ASSESSMENT: PAIN_FUNCTIONAL_ASSESSMENT: NONE - DENIES PAIN

## 2024-07-10 NOTE — ED PROVIDER NOTES
Behavior: Behavior normal.         Thought Content: Thought content normal.         Judgment: Judgment normal.         DIAGNOSTIC RESULTS     EKG:All EKG's are interpreted by the Emergency Department Physician who either signs or Co-signs this chart in the absence of a cardiologist.        RADIOLOGY:   Non-plain film images such as CT, Ultrasound and MRI are read by theradiologist. Plain radiographic images are visualized and preliminarily interpreted by the emergency physician with the below findings:    Interpretation per theRadiologist below, if available at the time of this note:    No orders to display           LABS:  Labs Reviewed   URINE DRUG SCREEN - Abnormal; Notable for the following components:       Result Value    Cannabinoid Scrn, Ur POSITIVE (*)     Opiate Screen, Urine POSITIVE (*)     FENTANYL SCREEN, URINE POSITIVE (*)     All other components within normal limits   POC PREGNANCY UR-QUAL       All other labs were within normal range or not returned as of this dictation.    EMERGENCY DEPARTMENT COURSE and DIFFERENTIAL DIAGNOSIS/MDM:   Vitals:    Vitals:    07/10/24 1843 07/10/24 1844   BP: 112/69    Pulse: (!) 103    Resp: 20    Temp:  98 °F (36.7 °C)   TempSrc:  Oral   SpO2: 100%    Weight: 45.4 kg (100 lb)          MDM    Patient presents after concerns for opiate overdose with positive response to Narcan    Drug screen positive for THC, opiates, fentanyl    Patient has had no lethargy or drowsiness throughout her visit.  No physical complaints.  Monitored in the ER for 1.5 hours, again without lethargy or drowsiness.  No further need for Narcan.    Standard anticipatory guidance given to patient upon discharge. Have given them a specific time frame in which to follow-up and who to follow-up with. I have also advised them that they should return to the emergency department if they get worse, or not getting better or develop any new or concerning symptoms. Patient demonstrates

## 2024-07-10 NOTE — ED TRIAGE NOTES
Pt to ER today via EMS c/o overdose  Per EMS reported pt was unresponsive and family called EMS  EMS reported family gave 16mg narcan IN and LPD gave 4mg IN when pt woke up   Pt denied taking anything today and reported she has not used since she has been out of rehab in Feb.   Pt reported she smoked a \"blunt\" earlier around 4pm today and reported she got this marijuana from a different dealer.   Pt is A+Ox4 at this time, respirations even and unlabored. Pt denied any pain at this time.

## 2024-08-16 NOTE — PROGRESS NOTES
OB visit    Carter Alegria is a 13y.o. year old female  @ 30.5 wk , L 2020 , TITI 2021 confirmed 6 wks US . POB: FTSVD @ 38 wks , female, Vonda Francis .       PGYN: denies      PMH: arthritis , juvenile rheumatoid arthritis - was on treatment few years ago. MTX             Mild intermittant asthma , albuterol prn.      PSH: denies      MEDS: none      Drug Allergies: NKDA     SOCHX: denies x 3      FH: Mother or Father , DM No , HTN No, Other No    /62   Pulse 92   Wt 130 lb (59 kg)   LMP 2020   BMI 23.78 kg/m²   Past Medical History:   Diagnosis Date    Abnormal hemoglobin (Hgb) ([address] 2017    Anemia     pt states she takes iron    Asthma     pt states born with asthma, pt uses albuterol    Rheumatoid arthritis flare ([address] 2018    Spinal muscular atrophy ([facility] 2017     History reviewed. No pertinent surgical history. Review of Systems  Constitutional: negative  Genitourinary:see above  Integument/breast: negative  Behavioral/Psych: negative  Endocrine: negative     All other systems reviewed and are negative. Physical Exam:  /62   Pulse 92   Wt 130 lb (59 kg)   LMP 2020   BMI 23.78 kg/m²   Skin: Warm, dry, no lesions or rashes  Extremities: Without clubbing, cyanosis and edema. Palms and nails are normal. Ambulates without difficulty  Neurological: No gross sensory or motor deficits. Abdomen: Soft, non-tender without masses or organomegaly  bowel sounds normoactive    HOF : 31 cm      FHT : 152 bpm     Assessment:   1. Encounter for supervision of other normal pregnancy in second trimester    2. 30 weeks gestation of pregnancy        Plan:   No orders of the defined types were placed in this encounter. No orders of the defined types were placed in this encounter.     Plan:   Amor Sy MD    Follow-up in 2 weeks  Early 1 hr OGTT - not indicated  Hep C screen indicated : no  FLU- indicated   TDAP- in third dimas Em M.D., F.AVRIL.C.O. G Detail Level: Detailed Depth Of Biopsy: dermis Was A Bandage Applied: Yes Biopsy Type: H and E Biopsy Method: Dermablade Anesthesia Type: 1% lidocaine with epinephrine and a 1:10 solution of 8.4% sodium bicarbonate Anesthesia Volume In Cc: 0.5 Additional Anesthesia Volume In Cc (Will Not Render If 0): 0 Hemostasis: Rosalba's Wound Care: Petrolatum Dressing: bandage Destruction After The Procedure: No Type Of Destruction Used: Curettage Curettage Text: The wound bed was treated with curettage after the biopsy was performed. Cryotherapy Text: The wound bed was treated with cryotherapy after the biopsy was performed. Electrodesiccation Text: The wound bed was treated with electrodesiccation after the biopsy was performed. Electrodesiccation And Curettage Text: The wound bed was treated with electrodesiccation and curettage after the biopsy was performed. Silver Nitrate Text: The wound bed was treated with silver nitrate after the biopsy was performed. Lab: -[id number] Consent: Written consent was obtained and risks were reviewed including but not limited to scarring, infection, bleeding, scabbing, incomplete removal, nerve damage and allergy to anesthesia. Post-Care Instructions: I reviewed with the patient in detail post-care instructions. Patient is to keep the biopsy site dry overnight, and then apply bacitracin twice daily until healed. Patient may apply hydrogen peroxide soaks to remove any crusting. Notification Instructions: Patient will be notified of biopsy results. However, patient instructed to call the office if not contacted within 2 weeks. Billing Type: Third-Party Bill Information: Selecting Yes will display possible errors in your note based on the variables you have selected. This validation is only offered as a suggestion for you. PLEASE NOTE THAT THE VALIDATION TEXT WILL BE REMOVED WHEN YOU FINALIZE YOUR NOTE. IF YOU WANT TO FAX A PRELIMINARY NOTE YOU WILL NEED TO TOGGLE THIS TO 'NO' IF YOU DO NOT WANT IT IN YOUR FAXED NOTE.

## 2024-09-25 ENCOUNTER — HOSPITAL ENCOUNTER (EMERGENCY)
Age: 24
Discharge: HOME | End: 2024-09-25
Payer: COMMERCIAL

## 2024-09-25 VITALS
RESPIRATION RATE: 18 BRPM | WEIGHT: 105 LBS | DIASTOLIC BLOOD PRESSURE: 77 MMHG | HEIGHT: 62 IN | OXYGEN SATURATION: 100 % | TEMPERATURE: 98.1 F | HEART RATE: 91 BPM | SYSTOLIC BLOOD PRESSURE: 115 MMHG | BODY MASS INDEX: 19.32 KG/M2

## 2024-09-25 DIAGNOSIS — N76.0 BACTERIAL VAGINOSIS: Primary | ICD-10-CM

## 2024-09-25 DIAGNOSIS — B96.89 BACTERIAL VAGINOSIS: Primary | ICD-10-CM

## 2024-09-25 LAB
BACTERIA URNS QL MICRO: NEGATIVE /HPF
BILIRUB UR QL STRIP: NEGATIVE
CLARITY UR: ABNORMAL
CLUE CELLS VAG QL WET PREP: ABNORMAL
COLOR UR: YELLOW
EPI CELLS #/AREA URNS AUTO: NORMAL /HPF (ref 0–5)
GLUCOSE UR STRIP-MCNC: NEGATIVE MG/DL
HCG, URINE, POC: NEGATIVE
HGB UR QL STRIP: NEGATIVE
HYALINE CASTS #/AREA URNS AUTO: NORMAL /HPF (ref 0–5)
KETONES UR STRIP-MCNC: NEGATIVE MG/DL
LEUKOCYTE ESTERASE UR QL STRIP: ABNORMAL
Lab: NORMAL
NEGATIVE QC PASS/FAIL: NORMAL
NITRITE UR QL STRIP: NEGATIVE
PH UR STRIP: 6 [PH] (ref 5–9)
POSITIVE QC PASS/FAIL: NORMAL
PROT UR STRIP-MCNC: NEGATIVE MG/DL
RBC #/AREA URNS AUTO: NORMAL /HPF (ref 0–5)
SP GR UR STRIP: 1.02 (ref 1–1.03)
T VAGINALIS VAG QL WET PREP: ABNORMAL
TRICHOMONAS VAGINALIS SCREEN: NEGATIVE
URINE REFLEX TO CULTURE: ABNORMAL
UROBILINOGEN UR STRIP-ACNC: 0.2 E.U./DL
WBC #/AREA URNS AUTO: NORMAL /HPF (ref 0–5)
YEAST VAG QL WET PREP: ABNORMAL

## 2024-09-25 PROCEDURE — 81001 URINALYSIS AUTO W/SCOPE: CPT

## 2024-09-25 PROCEDURE — 87808 TRICHOMONAS ASSAY W/OPTIC: CPT

## 2024-09-25 PROCEDURE — 99283 EMERGENCY DEPT VISIT LOW MDM: CPT

## 2024-09-25 PROCEDURE — 87210 SMEAR WET MOUNT SALINE/INK: CPT

## 2024-09-25 RX ORDER — METRONIDAZOLE 500 MG/1
500 TABLET ORAL 2 TIMES DAILY
Qty: 14 TABLET | Refills: 0 | Status: SHIPPED | OUTPATIENT
Start: 2024-09-25 | End: 2024-10-02

## 2024-09-25 ASSESSMENT — PAIN DESCRIPTION - PAIN TYPE: TYPE: ACUTE PAIN

## 2024-09-25 ASSESSMENT — ENCOUNTER SYMPTOMS
NAUSEA: 0
VOMITING: 0

## 2024-09-25 ASSESSMENT — PAIN DESCRIPTION - LOCATION: LOCATION: ABDOMEN

## 2024-09-25 ASSESSMENT — PAIN SCALES - GENERAL: PAINLEVEL_OUTOF10: 6

## 2024-09-25 ASSESSMENT — PAIN DESCRIPTION - FREQUENCY: FREQUENCY: INTERMITTENT

## 2024-09-25 ASSESSMENT — PAIN DESCRIPTION - DESCRIPTORS: DESCRIPTORS: CRAMPING

## 2024-09-25 ASSESSMENT — PAIN DESCRIPTION - ONSET: ONSET: ON-GOING

## 2024-09-25 ASSESSMENT — PAIN - FUNCTIONAL ASSESSMENT: PAIN_FUNCTIONAL_ASSESSMENT: 0-10

## 2024-10-08 ENCOUNTER — OFFICE VISIT (OUTPATIENT)
Dept: OBGYN CLINIC | Age: 24
End: 2024-10-08
Payer: COMMERCIAL

## 2024-10-08 VITALS
DIASTOLIC BLOOD PRESSURE: 60 MMHG | WEIGHT: 115 LBS | HEIGHT: 62 IN | BODY MASS INDEX: 21.16 KG/M2 | HEART RATE: 76 BPM | SYSTOLIC BLOOD PRESSURE: 100 MMHG

## 2024-10-08 DIAGNOSIS — Z11.51 SCREENING FOR HUMAN PAPILLOMAVIRUS (HPV): ICD-10-CM

## 2024-10-08 DIAGNOSIS — Z01.419 WELL WOMAN EXAM WITH ROUTINE GYNECOLOGICAL EXAM: Primary | ICD-10-CM

## 2024-10-08 DIAGNOSIS — Z11.3 SCREENING EXAMINATION FOR STD (SEXUALLY TRANSMITTED DISEASE): ICD-10-CM

## 2024-10-08 PROCEDURE — G8484 FLU IMMUNIZE NO ADMIN: HCPCS | Performed by: OBSTETRICS & GYNECOLOGY

## 2024-10-08 PROCEDURE — 99395 PREV VISIT EST AGE 18-39: CPT | Performed by: OBSTETRICS & GYNECOLOGY

## 2024-10-08 RX ORDER — FLUCONAZOLE 150 MG/1
TABLET ORAL
Qty: 3 TABLET | Refills: 0 | Status: SHIPPED | OUTPATIENT
Start: 2024-10-08

## 2024-10-08 ASSESSMENT — PATIENT HEALTH QUESTIONNAIRE - PHQ9
SUM OF ALL RESPONSES TO PHQ QUESTIONS 1-9: 0
1. LITTLE INTEREST OR PLEASURE IN DOING THINGS: NOT AT ALL
SUM OF ALL RESPONSES TO PHQ9 QUESTIONS 1 & 2: 0
SUM OF ALL RESPONSES TO PHQ QUESTIONS 1-9: 0
2. FEELING DOWN, DEPRESSED OR HOPELESS: NOT AT ALL
SUM OF ALL RESPONSES TO PHQ QUESTIONS 1-9: 0
SUM OF ALL RESPONSES TO PHQ QUESTIONS 1-9: 0

## 2024-10-08 NOTE — PROGRESS NOTES
woman exam with routine gynecological exam  PAP SMEAR      2. Screening for human papillomavirus (HPV)  PAP SMEAR      3. Screening examination for STD (sexually transmitted disease)  C.trachomatis N.gonorrhoeae DNA    Trichomonas Screen          Body mass index is 21.03 kg/m².  Obesity:  Normal weight  Smoking:  No    Plan:   Pap smear : indicated:  performed.  Breast exam : Normal  STD work up : Indicated  Assessment and Plan:    1. BV - Patient completed the course of metronidazole for bacterial vaginosis (BV) as prescribed by the emergency room. No signs of BV observed during the examination.     - No further treatment needed for BV at this time.    2. Yeast infection - Patient reported having symptoms of a yeast infection prior to BV treatment but was not given any treatment for it.     - Prescribe fluconazole (3 tablets), instruct the patient to take one tablet every 2 days, and to use a maximum of 2 tablets if needed. The patient can keep the remaining tablet for future use if necessary.    3. Contraception - Patient expressed interest in obtaining a new intrauterine device (IUD) after having it removed previously.     - Order IUD for the patient and provide a slip for the IUD.     - Instruct the patient to call the office in two weeks to check on the status of the IUD order.    4. Annual breast exam - Performed as part of the annual visit, no abnormalities detected.     - No further action needed at this time.    5. Pap smear - First pap smear performed for the patient.     - Await results and inform the patient if any abnormalities are detected.    6. Patient education - Instructed the patient to call the office if they experience any symptoms of BV or yeast infection in the future, using specific buzzwords (discharge with odor, itching) to facilitate appropriate treatment.     - No further action needed at this time.    Obesity Counseling:  N/A  Smoking Counseling:  N/A  STD counseling: Discussed safe

## 2024-11-08 ENCOUNTER — PROCEDURE VISIT (OUTPATIENT)
Dept: OBGYN CLINIC | Age: 24
End: 2024-11-08

## 2024-11-08 VITALS
HEART RATE: 72 BPM | DIASTOLIC BLOOD PRESSURE: 60 MMHG | SYSTOLIC BLOOD PRESSURE: 98 MMHG | WEIGHT: 117 LBS | BODY MASS INDEX: 21.53 KG/M2 | HEIGHT: 62 IN

## 2024-11-08 DIAGNOSIS — Z30.430 ENCOUNTER FOR INSERTION OF MIRENA IUD: ICD-10-CM

## 2024-11-08 DIAGNOSIS — N92.6 IRREGULAR MENSES: Primary | ICD-10-CM

## 2024-11-08 DIAGNOSIS — N64.4 MASTODYNIA OF RIGHT BREAST: ICD-10-CM

## 2024-11-08 LAB
HCG, URINE, POC: NEGATIVE
Lab: NORMAL
NEGATIVE QC PASS/FAIL: NORMAL
POSITIVE QC PASS/FAIL: NORMAL

## 2024-11-08 NOTE — PROGRESS NOTES
IUD Insertion Procedure Note    Pre-operative Diagnosis: irregular vaginal bleeding    Post-operative Diagnosis: same    Indications: irregular vaginal bleeding    Procedure Details   Urine pregnancy test was done and result was negative.  The risks (including infection, bleeding, pain, and uterine perforation) and benefits of the procedure were explained to the patient and Written informed consent was obtained.      Cervix cleansed with Betadine. Uterus sounded to 7 cm. IUD inserted without difficulty. String visible and trimmed. Patient tolerated procedure well.    IUD Information:  Mirena.    Condition:  Stable    Complications:  None    Plan:    The patient was advised to call for any fever or for prolonged or severe pain or bleeding. She was advised to use OTC ibuprofen as needed for mild to moderate pain.     Follow up:  No follow-ups on file.      Rodolfo Whitehead MD

## 2024-11-08 NOTE — PROGRESS NOTES
Shu Murphy is a 24 y.o. female who presents here today for complaints of Procedure (Mirena insertion.) and Breast Pain (Right.)    Right breast pain   .      Vitals:  BP 98/60 (Site: Left Upper Arm, Position: Sitting, Cuff Size: Medium Adult)   Pulse 72   Ht 1.575 m (5' 2\")   Wt 53.1 kg (117 lb)   LMP 10/22/2024 (Within Days)   Breastfeeding No   BMI 21.40 kg/m²   Allergies:  Patient has no known allergies.  Past Medical History:   Diagnosis Date    Abnormal hemoglobin (Hgb) ([facility]) 2017    Anemia     pt states she takes iron    Asthma     pt states born with asthma, pt uses albuterol    Rheumatoid arthritis flare ([facility]) 2018    Spinal muscular atrophy ([facility]) 2017     No past surgical history on file.  OB History          2    Para   2    Term   1       1    AB        Living   2         SAB        IAB        Ectopic        Molar        Multiple   0    Live Births   2              Family History   Problem Relation Age of Onset    Diabetes Maternal Aunt     Arthritis Maternal Grandmother     Diabetes Maternal Grandmother      Social History     Socioeconomic History    Marital status: Single     Spouse name: Not on file    Number of children: Not on file    Years of education: Not on file    Highest education level: Not on file   Occupational History    Not on file   Tobacco Use    Smoking status: Never    Smokeless tobacco: Never   Vaping Use    Vaping status: Never Used   Substance and Sexual Activity    Alcohol use: Not Currently     Comment: social    Drug use: Not Currently     Types: Marijuana (Weed), Opiates     Sexual activity: Not Currently     Partners: Male   Other Topics Concern    Not on file   Social History Narrative    Not on file     Social Determinants of Health     Financial Resource Strain: Medium Risk (2023)    Overall Financial Resource Strain (CARDIA)     Difficulty of Paying Living Expenses: Somewhat hard   Food Insecurity: Food Insecurity

## 2024-12-04 ENCOUNTER — APPOINTMENT (OUTPATIENT)
Dept: URGENT CARE | Age: 24
End: 2024-12-04

## 2025-04-28 ENCOUNTER — OFFICE VISIT (OUTPATIENT)
Age: 25
End: 2025-04-28
Payer: COMMERCIAL

## 2025-04-28 VITALS
WEIGHT: 118 LBS | BODY MASS INDEX: 21.71 KG/M2 | DIASTOLIC BLOOD PRESSURE: 74 MMHG | OXYGEN SATURATION: 98 % | HEART RATE: 60 BPM | SYSTOLIC BLOOD PRESSURE: 100 MMHG | HEIGHT: 62 IN

## 2025-04-28 DIAGNOSIS — N89.8 VAGINAL DISCHARGE: ICD-10-CM

## 2025-04-28 DIAGNOSIS — Z11.3 ROUTINE SCREENING FOR STI (SEXUALLY TRANSMITTED INFECTION): ICD-10-CM

## 2025-04-28 DIAGNOSIS — N76.0 ACUTE VAGINITIS: ICD-10-CM

## 2025-04-28 DIAGNOSIS — L30.9 DERMATITIS: ICD-10-CM

## 2025-04-28 DIAGNOSIS — Z11.4 SCREENING FOR HIV (HUMAN IMMUNODEFICIENCY VIRUS): ICD-10-CM

## 2025-04-28 DIAGNOSIS — N76.0 ACUTE VAGINITIS: Primary | ICD-10-CM

## 2025-04-28 PROCEDURE — 1036F TOBACCO NON-USER: CPT | Performed by: NURSE PRACTITIONER

## 2025-04-28 PROCEDURE — 99212 OFFICE O/P EST SF 10 MIN: CPT | Performed by: NURSE PRACTITIONER

## 2025-04-28 PROCEDURE — G8420 CALC BMI NORM PARAMETERS: HCPCS | Performed by: NURSE PRACTITIONER

## 2025-04-28 PROCEDURE — 99213 OFFICE O/P EST LOW 20 MIN: CPT | Performed by: NURSE PRACTITIONER

## 2025-04-28 PROCEDURE — G8427 DOCREV CUR MEDS BY ELIG CLIN: HCPCS | Performed by: NURSE PRACTITIONER

## 2025-04-28 RX ORDER — TRIAMCINOLONE ACETONIDE 1 MG/G
OINTMENT TOPICAL 2 TIMES DAILY
Qty: 80 G | Refills: 0 | Status: SHIPPED | OUTPATIENT
Start: 2025-04-28 | End: 2025-05-05

## 2025-04-28 RX ORDER — FLUCONAZOLE 150 MG/1
150 TABLET ORAL
Qty: 2 TABLET | Refills: 0 | Status: SHIPPED | OUTPATIENT
Start: 2025-04-28 | End: 2025-05-04

## 2025-04-28 SDOH — ECONOMIC STABILITY: FOOD INSECURITY: WITHIN THE PAST 12 MONTHS, YOU WORRIED THAT YOUR FOOD WOULD RUN OUT BEFORE YOU GOT MONEY TO BUY MORE.: NEVER TRUE

## 2025-04-28 SDOH — ECONOMIC STABILITY: FOOD INSECURITY: WITHIN THE PAST 12 MONTHS, THE FOOD YOU BOUGHT JUST DIDN'T LAST AND YOU DIDN'T HAVE MONEY TO GET MORE.: NEVER TRUE

## 2025-04-28 ASSESSMENT — ENCOUNTER SYMPTOMS
ABDOMINAL PAIN: 0
SORE THROAT: 0
TROUBLE SWALLOWING: 0
COUGH: 0
VOMITING: 0
DIARRHEA: 0
NAUSEA: 0
COLOR CHANGE: 0
BACK PAIN: 0

## 2025-04-28 ASSESSMENT — PATIENT HEALTH QUESTIONNAIRE - PHQ9
SUM OF ALL RESPONSES TO PHQ QUESTIONS 1-9: 0
SUM OF ALL RESPONSES TO PHQ QUESTIONS 1-9: 0
2. FEELING DOWN, DEPRESSED OR HOPELESS: NOT AT ALL
SUM OF ALL RESPONSES TO PHQ QUESTIONS 1-9: 0
1. LITTLE INTEREST OR PLEASURE IN DOING THINGS: NOT AT ALL
SUM OF ALL RESPONSES TO PHQ QUESTIONS 1-9: 0

## 2025-04-28 NOTE — PROGRESS NOTES
Subjective  Rebeccaleonor Murphy, 24 y.o. female presents today with:  Chief Complaint   Patient presents with    Leg Injury     Prior     Sexually Transmitted Diseases     Test for everything        HPI  Presents to walk-in clinic for STI testing & a skin concern   No new partner since last tested STI   Routine screening, along with wanting HIV screen   Concern of yeast infection   Vaginal itching/burning    Reports white vaginal discharge   Denies dysuria   Denies hematuria   Denies frequency/urgency of urination   Denies abdominal or pelvic discomfort   Denies fever or chills   Eating and drinking   History of BV       Left shin   Injury years prior   Reports concern of scar looking infected or like a blood clot   Denies discomfort with ambulation   Denies erythema or swelling of affected limb                   Past Medical History:   Diagnosis Date    Abnormal hemoglobin (Hgb) 11/20/2017    Anemia     pt states she takes iron    Asthma     pt states born with asthma, pt uses albuterol    Rheumatoid arthritis flare ([facility]) 2/7/2018    Spinal muscular atrophy ([facility]) 11/20/2017      No past surgical history on file.  Family History   Problem Relation Age of Onset    Diabetes Maternal Aunt     Arthritis Maternal Grandmother     Diabetes Maternal Grandmother              Review of Systems   Constitutional:  Negative for activity change, appetite change, chills, diaphoresis, fatigue and fever.   HENT:  Negative for sore throat and trouble swallowing.    Respiratory:  Negative for cough.    Gastrointestinal:  Negative for abdominal pain, diarrhea, nausea and vomiting.   Musculoskeletal:  Negative for arthralgias, back pain, gait problem, joint swelling, myalgias, neck pain and neck stiffness.   Skin:  Negative for color change and rash.   Neurological:  Negative for dizziness, light-headedness and headaches.         PMH, Surgical Hx, Family Hx, and Social Hx reviewed and updated.            Objective  Vitals:    04/28/25

## 2025-04-29 LAB
CLUE CELLS VAG QL WET PREP: ABNORMAL
T VAGINALIS VAG QL WET PREP: ABNORMAL
TRICHOMONAS VAGINALIS SCREEN: NEGATIVE
YEAST VAG QL WET PREP: ABNORMAL

## 2025-04-30 ENCOUNTER — RESULTS FOLLOW-UP (OUTPATIENT)
Age: 25
End: 2025-04-30

## 2025-04-30 LAB
C TRACH DNA UR QL NAA+PROBE: NEGATIVE
N GONORRHOEA DNA UR QL NAA+PROBE: NEGATIVE